# Patient Record
Sex: FEMALE | Race: WHITE | URBAN - METROPOLITAN AREA
[De-identification: names, ages, dates, MRNs, and addresses within clinical notes are randomized per-mention and may not be internally consistent; named-entity substitution may affect disease eponyms.]

---

## 2020-04-14 ENCOUNTER — EMERGENCY (EMERGENCY)
Facility: HOSPITAL | Age: 30
LOS: 1 days | Discharge: HOME | End: 2020-04-14
Admitting: EMERGENCY MEDICINE
Payer: COMMERCIAL

## 2020-04-14 VITALS
RESPIRATION RATE: 20 BRPM | SYSTOLIC BLOOD PRESSURE: 132 MMHG | TEMPERATURE: 97 F | HEART RATE: 85 BPM | DIASTOLIC BLOOD PRESSURE: 70 MMHG | OXYGEN SATURATION: 99 %

## 2020-04-14 PROCEDURE — 93970 EXTREMITY STUDY: CPT | Mod: 26

## 2020-04-14 PROCEDURE — 99284 EMERGENCY DEPT VISIT MOD MDM: CPT

## 2020-04-14 NOTE — ED PROVIDER NOTE - PATIENT PORTAL LINK FT
You can access the FollowMyHealth Patient Portal offered by [facility] by registering at the following website: [email or web address]. By joining [employer]’s FollowMyHealth portal, you will also be able to view your health information using other applications (apps) compatible with our system.

## 2020-04-14 NOTE — ED PROVIDER NOTE - PHYSICAL EXAMINATION
Physical Exam    Vital Signs: I have reviewed the initial vital signs.  Constitutional: well-nourished, appears stated age, no acute distress  Cardiovascular: regular rate, regular rhythm, well-perfused extremities, radial pulses equal and 2+  Respiratory: unlabored respiratory effort, clear to auscultation bilaterally no wheezing, rales and rhonchi. pt speaking full sentences. no accessory muscle use.   Musculoskeletal: no lower extremity edema, no calf tenderness to palpation b/l. negative homman's sign b/l. left knee without tenderness, erythema, or swelling. FROM of left knee. No tenderness erythema, swelling, or mass above the left knee. b/l pedal pulses 2+ and intact. pt feet are warm b/l. no skin changes.   Integumentary: warm, dry, no rash. no cyanosis, no molting of the skin. no stasis dermatitis of lower extremities b/l.   Neurologic: b/l lower extremities’ motor and sensory functions grossly intact Physical Exam    Vital Signs: I have reviewed the initial vital signs.  Constitutional: well-nourished, appears stated age, no acute distress  Cardiovascular: regular rate, regular rhythm, well-perfused extremities, radial pulses equal and 2+  Respiratory: unlabored respiratory effort, clear to auscultation bilaterally no wheezing, rales and rhonchi. pt speaking full sentences. no accessory muscle use.   Musculoskeletal: no lower extremity edema, no calf tenderness to palpation b/l. negative homman's sign b/l. (+) tender muscle bulge and spasm over the distal left quadriceps tendon. left knee without tenderness, erythema, or swelling. FROM of left knee. No swelling, or mass above the left knee. b/l pedal pulses 2+ and intact. pt feet are warm b/l. no skin changes.   Integumentary: warm, dry, no rash. no cyanosis, no molting of the skin. no stasis dermatitis of lower extremities b/l.   Neurologic: b/l lower extremities’ motor and sensory functions grossly intact Physical Exam    Vital Signs: I have reviewed the initial vital signs.  Constitutional: well-nourished, appears stated age, no acute distress  Cardiovascular: regular rate, regular rhythm, well-perfused extremities, radial pulses equal and 2+  Respiratory: unlabored respiratory effort, clear to auscultation bilaterally no wheezing, rales and rhonchi. pt speaking full sentences. no accessory muscle use.   Musculoskeletal: no lower extremity edema, no calf tenderness to palpation b/l. negative tonny's sign b/l. (+) tender muscle bulge and spasm over the distal left quadriceps tendon. left knee without tenderness, erythema, or swelling. FROM of left knee. No swelling, or mass above the left knee. b/l pedal pulses 2+ and intact. pt feet are warm b/l. no skin changes.   Integumentary: warm, dry, no rash. no cyanosis, no molting of the skin. no stasis dermatitis of lower extremities b/l.   Neurologic: b/l lower extremities’ motor and sensory functions grossly intact

## 2020-04-14 NOTE — ED PROVIDER NOTE - PROGRESS NOTE DETAILS
spoke with ty from vascular, negative for dvt I was directly involved in the management of this patient. Case was discussed with PA Fellow Edis

## 2020-04-14 NOTE — ED PROVIDER NOTE - CLINICAL SUMMARY MEDICAL DECISION MAKING FREE TEXT BOX
vascular duplex negative for dvt. advised pt to try antiinflammatories if pain continues. advised of return precaution such as worsenign leg pain, skil changes, inability to ambulate. agreeable to dc. vascular duplex negative for dvt. advised pt to try anti-inflammatories if pain continues. advised of return precaution such as worsening leg pain, skin changes, inability to ambulate. agreeable to dc.  I have discussed the discharge plan with the patient. The patient agrees with the plan, as discussed.  The patient understands Emergency Department diagnosis is a preliminary diagnosis often based on limited information and that the patient must adhere to the follow-up plan as discussed.  The patient understands that if the symptoms worsen or if prescribed medications do not have the desired/planned effect that the patient may return to the Emergency Department at any time for further evaluation and treatment.

## 2020-04-14 NOTE — ED PROVIDER NOTE - NS ED ROS FT
CONST: No fever, chills or bodyaches  EYES: No pain, redness, drainage or visual changes.  ENT: No ear pain or discharge, nasal discharge or congestion. No sore throat  CARD: No chest pain, palpitations  RESP: No SOB, cough, hemoptysis. No hx of asthma or COPD  GI: No abdominal pain, N/V/D  MS: (+) left calf pain, and pain above the left knee. No joint pain, back pain.   SKIN: No rashes  NEURO: No headache, dizziness, paresthesias or LOC

## 2020-04-14 NOTE — ED PROVIDER NOTE - OBJECTIVE STATEMENT
29 y/o female with no significant PMH presents to the ED for evaluation of intermittent left calf throbbing, and discomfort above the left knee x 2-3 weeks. pt admits discomfort began after a long walk. pt admits pain is worse with weight bearing. pt denies alleviating factors. pt admits father had a blood clot years ago without known reason for it. pt denies hx of blood clot, recent travel, recent surgeries, recent hospitalizations, recent trauma, use of birth control, recent pregnancy, cigarette smoking, illicit drug use, chest pain, numbness, tingling, or sob. 29 y/o female with no significant PMH presents to the ED for evaluation of intermittent left calf throbbing, and discomfort above the left knee x 2-3 weeks. pt admits discomfort began after a long walk. Pt reports walking mor frequently than usual in the past few weeks. pt admits pain is worse with weight bearing. pt denies alleviating factors. pt admits father had a blood clot years ago without known reason for it. pt denies hx of blood clot, recent travel, recent surgeries, recent hospitalizations, recent trauma, use of birth control, recent pregnancy, cigarette smoking, illicit drug use, chest pain, numbness, tingling, or sob.

## 2020-04-18 DIAGNOSIS — M79.669 PAIN IN UNSPECIFIED LOWER LEG: ICD-10-CM

## 2020-04-18 DIAGNOSIS — Z88.2 ALLERGY STATUS TO SULFONAMIDES: ICD-10-CM

## 2020-04-18 DIAGNOSIS — M79.662 PAIN IN LEFT LOWER LEG: ICD-10-CM

## 2020-10-28 PROBLEM — Z78.9 OTHER SPECIFIED HEALTH STATUS: Chronic | Status: ACTIVE | Noted: 2020-04-14

## 2020-10-28 PROBLEM — Z00.00 ENCOUNTER FOR PREVENTIVE HEALTH EXAMINATION: Status: ACTIVE | Noted: 2020-10-28

## 2020-10-29 ENCOUNTER — APPOINTMENT (OUTPATIENT)
Dept: OBGYN | Facility: CLINIC | Age: 30
End: 2020-10-29

## 2021-03-08 ENCOUNTER — APPOINTMENT (OUTPATIENT)
Dept: OBGYN | Facility: CLINIC | Age: 31
End: 2021-03-08
Payer: COMMERCIAL

## 2021-03-08 VITALS
TEMPERATURE: 97 F | BODY MASS INDEX: 28.93 KG/M2 | DIASTOLIC BLOOD PRESSURE: 72 MMHG | SYSTOLIC BLOOD PRESSURE: 110 MMHG | HEIGHT: 66 IN | WEIGHT: 180 LBS

## 2021-03-08 DIAGNOSIS — F43.9 REACTION TO SEVERE STRESS, UNSPECIFIED: ICD-10-CM

## 2021-03-08 DIAGNOSIS — Z78.9 OTHER SPECIFIED HEALTH STATUS: ICD-10-CM

## 2021-03-08 DIAGNOSIS — Z80.41 FAMILY HISTORY OF MALIGNANT NEOPLASM OF OVARY: ICD-10-CM

## 2021-03-08 LAB
BILIRUB UR QL STRIP: NORMAL
CLARITY UR: CLEAR
GLUCOSE UR-MCNC: NORMAL
HCG UR QL: NORMAL EU/DL
HGB UR QL STRIP.AUTO: NORMAL
KETONES UR-MCNC: NORMAL
LEUKOCYTE ESTERASE UR QL STRIP: NORMAL
NITRITE UR QL STRIP: NORMAL
PH UR STRIP: 5.5
PROT UR STRIP-MCNC: NORMAL
SP GR UR STRIP: 1.03

## 2021-03-08 PROCEDURE — 99214 OFFICE O/P EST MOD 30 MIN: CPT

## 2021-03-08 PROCEDURE — 99072 ADDL SUPL MATRL&STAF TM PHE: CPT

## 2021-03-08 PROCEDURE — 81003 URINALYSIS AUTO W/O SCOPE: CPT | Mod: QW

## 2021-03-08 RX ORDER — CLINDAMYCIN HYDROCHLORIDE 300 MG/1
300 CAPSULE ORAL
Qty: 20 | Refills: 0 | Status: ACTIVE | COMMUNITY
Start: 2021-03-08 | End: 1900-01-01

## 2021-03-08 RX ORDER — FLUCONAZOLE 150 MG/1
150 TABLET ORAL
Qty: 15 | Refills: 1 | Status: ACTIVE | COMMUNITY
Start: 2021-03-08 | End: 1900-01-01

## 2021-03-08 RX ORDER — CLINDAMYCIN PHOSPHATE 20 MG/G
2 CREAM VAGINAL
Qty: 2 | Refills: 3 | Status: ACTIVE | COMMUNITY
Start: 2021-03-08 | End: 1900-01-01

## 2021-03-30 ENCOUNTER — APPOINTMENT (OUTPATIENT)
Dept: OBGYN | Facility: CLINIC | Age: 31
End: 2021-03-30
Payer: COMMERCIAL

## 2021-03-30 PROCEDURE — 99072 ADDL SUPL MATRL&STAF TM PHE: CPT

## 2021-03-30 PROCEDURE — 76856 US EXAM PELVIC COMPLETE: CPT | Mod: 59

## 2021-03-30 PROCEDURE — 76830 TRANSVAGINAL US NON-OB: CPT

## 2021-04-07 ENCOUNTER — LABORATORY RESULT (OUTPATIENT)
Age: 31
End: 2021-04-07

## 2021-04-08 ENCOUNTER — APPOINTMENT (OUTPATIENT)
Dept: OBGYN | Facility: CLINIC | Age: 31
End: 2021-04-08
Payer: COMMERCIAL

## 2021-04-08 VITALS — BODY MASS INDEX: 28.93 KG/M2 | TEMPERATURE: 97.2 F | HEIGHT: 66 IN | WEIGHT: 180 LBS

## 2021-04-08 DIAGNOSIS — N76.0 ACUTE VAGINITIS: ICD-10-CM

## 2021-04-08 DIAGNOSIS — R10.2 PELVIC AND PERINEAL PAIN: ICD-10-CM

## 2021-04-08 LAB
BILIRUB UR QL STRIP: NORMAL
GLUCOSE UR-MCNC: NORMAL
HCG UR QL: 0.2 EU/DL
HGB UR QL STRIP.AUTO: NORMAL
KETONES UR-MCNC: NORMAL
LEUKOCYTE ESTERASE UR QL STRIP: NORMAL
NITRITE UR QL STRIP: NORMAL
PH UR STRIP: 5.5
PROT UR STRIP-MCNC: NORMAL
SP GR UR STRIP: 1.03

## 2021-04-08 PROCEDURE — 99213 OFFICE O/P EST LOW 20 MIN: CPT

## 2021-04-08 PROCEDURE — 81003 URINALYSIS AUTO W/O SCOPE: CPT | Mod: QW

## 2021-04-08 PROCEDURE — 99072 ADDL SUPL MATRL&STAF TM PHE: CPT

## 2021-04-19 LAB
CYTOLOGY CVX/VAG DOC THIN PREP: NORMAL
HPV HIGH+LOW RISK DNA PNL CVX: DETECTED

## 2021-04-21 ENCOUNTER — NON-APPOINTMENT (OUTPATIENT)
Age: 31
End: 2021-04-21

## 2021-05-24 ENCOUNTER — NON-APPOINTMENT (OUTPATIENT)
Age: 31
End: 2021-05-24

## 2021-06-14 ENCOUNTER — APPOINTMENT (OUTPATIENT)
Dept: OBGYN | Facility: CLINIC | Age: 31
End: 2021-06-14
Payer: COMMERCIAL

## 2021-06-14 VITALS — TEMPERATURE: 97.8 F

## 2021-06-14 DIAGNOSIS — Z86.19 PERSONAL HISTORY OF OTHER INFECTIOUS AND PARASITIC DISEASES: ICD-10-CM

## 2021-06-14 DIAGNOSIS — N89.8 OTHER SPECIFIED NONINFLAMMATORY DISORDERS OF VAGINA: ICD-10-CM

## 2021-06-14 PROCEDURE — 99072 ADDL SUPL MATRL&STAF TM PHE: CPT

## 2021-06-14 PROCEDURE — 99213 OFFICE O/P EST LOW 20 MIN: CPT

## 2021-06-17 ENCOUNTER — NON-APPOINTMENT (OUTPATIENT)
Age: 31
End: 2021-06-17

## 2021-07-19 ENCOUNTER — EMERGENCY (EMERGENCY)
Facility: HOSPITAL | Age: 31
LOS: 0 days | Discharge: HOME | End: 2021-07-20
Attending: EMERGENCY MEDICINE | Admitting: STUDENT IN AN ORGANIZED HEALTH CARE EDUCATION/TRAINING PROGRAM
Payer: COMMERCIAL

## 2021-07-19 VITALS
DIASTOLIC BLOOD PRESSURE: 60 MMHG | HEART RATE: 90 BPM | RESPIRATION RATE: 18 BRPM | SYSTOLIC BLOOD PRESSURE: 120 MMHG | TEMPERATURE: 98 F | WEIGHT: 179.9 LBS | HEIGHT: 64 IN | OXYGEN SATURATION: 100 %

## 2021-07-19 DIAGNOSIS — R60.0 LOCALIZED EDEMA: ICD-10-CM

## 2021-07-19 DIAGNOSIS — R20.0 ANESTHESIA OF SKIN: ICD-10-CM

## 2021-07-19 DIAGNOSIS — R53.1 WEAKNESS: ICD-10-CM

## 2021-07-19 DIAGNOSIS — Z88.2 ALLERGY STATUS TO SULFONAMIDES: ICD-10-CM

## 2021-07-19 DIAGNOSIS — Z91.040 LATEX ALLERGY STATUS: ICD-10-CM

## 2021-07-19 DIAGNOSIS — Z91.013 ALLERGY TO SEAFOOD: ICD-10-CM

## 2021-07-19 LAB
ALBUMIN SERPL ELPH-MCNC: 4.4 G/DL — SIGNIFICANT CHANGE UP (ref 3.5–5.2)
ALP SERPL-CCNC: 90 U/L — SIGNIFICANT CHANGE UP (ref 30–115)
ALT FLD-CCNC: 25 U/L — SIGNIFICANT CHANGE UP (ref 0–41)
ANION GAP SERPL CALC-SCNC: 10 MMOL/L — SIGNIFICANT CHANGE UP (ref 7–14)
AST SERPL-CCNC: 23 U/L — SIGNIFICANT CHANGE UP (ref 0–41)
BASOPHILS # BLD AUTO: 0.05 K/UL — SIGNIFICANT CHANGE UP (ref 0–0.2)
BASOPHILS NFR BLD AUTO: 0.4 % — SIGNIFICANT CHANGE UP (ref 0–1)
BILIRUB SERPL-MCNC: <0.2 MG/DL — SIGNIFICANT CHANGE UP (ref 0.2–1.2)
BUN SERPL-MCNC: 7 MG/DL — LOW (ref 10–20)
CALCIUM SERPL-MCNC: 9.3 MG/DL — SIGNIFICANT CHANGE UP (ref 8.5–10.1)
CHLORIDE SERPL-SCNC: 106 MMOL/L — SIGNIFICANT CHANGE UP (ref 98–110)
CO2 SERPL-SCNC: 26 MMOL/L — SIGNIFICANT CHANGE UP (ref 17–32)
CREAT SERPL-MCNC: 0.8 MG/DL — SIGNIFICANT CHANGE UP (ref 0.7–1.5)
EOSINOPHIL # BLD AUTO: 0.29 K/UL — SIGNIFICANT CHANGE UP (ref 0–0.7)
EOSINOPHIL NFR BLD AUTO: 2.4 % — SIGNIFICANT CHANGE UP (ref 0–8)
GLUCOSE SERPL-MCNC: 94 MG/DL — SIGNIFICANT CHANGE UP (ref 70–99)
HCT VFR BLD CALC: 43.5 % — SIGNIFICANT CHANGE UP (ref 37–47)
HGB BLD-MCNC: 14.1 G/DL — SIGNIFICANT CHANGE UP (ref 12–16)
IMM GRANULOCYTES NFR BLD AUTO: 0.3 % — SIGNIFICANT CHANGE UP (ref 0.1–0.3)
LYMPHOCYTES # BLD AUTO: 27 % — SIGNIFICANT CHANGE UP (ref 20.5–51.1)
LYMPHOCYTES # BLD AUTO: 3.2 K/UL — SIGNIFICANT CHANGE UP (ref 1.2–3.4)
MAGNESIUM SERPL-MCNC: 2 MG/DL — SIGNIFICANT CHANGE UP (ref 1.8–2.4)
MCHC RBC-ENTMCNC: 29 PG — SIGNIFICANT CHANGE UP (ref 27–31)
MCHC RBC-ENTMCNC: 32.4 G/DL — SIGNIFICANT CHANGE UP (ref 32–37)
MCV RBC AUTO: 89.3 FL — SIGNIFICANT CHANGE UP (ref 81–99)
MONOCYTES # BLD AUTO: 0.92 K/UL — HIGH (ref 0.1–0.6)
MONOCYTES NFR BLD AUTO: 7.8 % — SIGNIFICANT CHANGE UP (ref 1.7–9.3)
NEUTROPHILS # BLD AUTO: 7.34 K/UL — HIGH (ref 1.4–6.5)
NEUTROPHILS NFR BLD AUTO: 62.1 % — SIGNIFICANT CHANGE UP (ref 42.2–75.2)
NRBC # BLD: 0 /100 WBCS — SIGNIFICANT CHANGE UP (ref 0–0)
PLATELET # BLD AUTO: 251 K/UL — SIGNIFICANT CHANGE UP (ref 130–400)
POTASSIUM SERPL-MCNC: 4.2 MMOL/L — SIGNIFICANT CHANGE UP (ref 3.5–5)
POTASSIUM SERPL-SCNC: 4.2 MMOL/L — SIGNIFICANT CHANGE UP (ref 3.5–5)
PROT SERPL-MCNC: 7.3 G/DL — SIGNIFICANT CHANGE UP (ref 6–8)
RBC # BLD: 4.87 M/UL — SIGNIFICANT CHANGE UP (ref 4.2–5.4)
RBC # FLD: 12.6 % — SIGNIFICANT CHANGE UP (ref 11.5–14.5)
SODIUM SERPL-SCNC: 142 MMOL/L — SIGNIFICANT CHANGE UP (ref 135–146)
WBC # BLD: 11.84 K/UL — HIGH (ref 4.8–10.8)
WBC # FLD AUTO: 11.84 K/UL — HIGH (ref 4.8–10.8)

## 2021-07-19 PROCEDURE — 99218: CPT

## 2021-07-19 PROCEDURE — 99283 EMERGENCY DEPT VISIT LOW MDM: CPT

## 2021-07-19 PROCEDURE — 93970 EXTREMITY STUDY: CPT | Mod: 26

## 2021-07-19 RX ORDER — ACETAMINOPHEN 500 MG
975 TABLET ORAL ONCE
Refills: 0 | Status: COMPLETED | OUTPATIENT
Start: 2021-07-19 | End: 2021-07-19

## 2021-07-19 RX ADMIN — Medication 975 MILLIGRAM(S): at 22:25

## 2021-07-19 NOTE — ED PROVIDER NOTE - CLINICAL SUMMARY MEDICAL DECISION MAKING FREE TEXT BOX
32 Y/O F WITH BACK PAIN AND LLE PAIN AND WEAKNESS. NO MOTOR WEAKNESS ON EXAM. PT EVALUATED [facility] NEUROLOGY AND MRI RECOMMENDED. PT TO EDOU FOR MRI AND OBSERVATION.

## 2021-07-19 NOTE — CONSULT NOTE ADULT - ATTENDING COMMENTS
I have personally seen and examined this patient.  I have fully participated in the care of this patient.  I have reviewed all pertinent clinical information, including history, physical exam, plan and note.   I have reviewed all pertinent clinical information and reviewed all relevant imaging and diagnostic studies personally.  Pt w/ reported LLE swelling, weakness and numbness with ? findings on exam has intact DTRs r/o thoracic etiology.  Recommendations as above.  Agree with above assessment except as noted.

## 2021-07-19 NOTE — ED PROVIDER NOTE - PHYSICAL EXAMINATION
CONSTITUTIONAL: Well-developed; well-nourished; in no acute distress, nontoxic appearing  SKIN: skin exam is warm and dry  HEAD: Normocephalic; atraumatic  EXT: LLE: edema to dorsum of L foot, pulses 2+ bilaterally. no skin changes/warmth/erythema. No calf tenderness. pelvis stable. steady gait.   NEURO: awake, alert, following commands, oriented, grossly unremarkable. No Focal deficits. GCS 15. no motor/sensory deficit.   PSYCH: Cooperative, appropriate.

## 2021-07-19 NOTE — ED CDU PROVIDER INITIAL DAY NOTE - NS ED ROS FT
Constitutional: (-) fever  Eyes/ENT: (-) blurry vision, (-) epistaxis  Cardiovascular: (-) chest pain, (-) syncope  Respiratory: (-) cough, (-) shortness of breath  Gastrointestinal: (-) vomiting, (-) diarrhea  Musculoskeletal: (-) neck pain, (-) back pain, (+) joint pain  Integumentary: (-) rash, (+) edema  Neurological: (-) headache, (-) altered mental status  Psychiatric: (-) hallucinations  Allergic/Immunologic: (-) pruritus

## 2021-07-19 NOTE — CONSULT NOTE ADULT - ASSESSMENT
Impression:  31 year old woman with no PMH presents to the ED with LLE swelling, pain and numbness for one day. Patient denies trauma. Mild swelling and decreased sensation to distal LLE.     Suggestion:  Patient having LLE duplex now.   Attending to follow.   Impression:  31 year old woman with no PMH presents to the ED with LLE swelling, pain and numbness for one day. Patient denies trauma. Mild swelling and decreased sensation LLE w/ ? weakness.  Has slight hyerreflexia recommend r/o thoracic cord etiology.      Suggestion:  MRI thoracic spine w/w/o bruno  if MR (-), may d/c w/ outpt neurology clinic f/u in 2 - 4 wks and may consider additional testing if no improvement  consider podiatry evaluation for foot swelling  discussed with ED team

## 2021-07-19 NOTE — ED CDU PROVIDER INITIAL DAY NOTE - PHYSICAL EXAMINATION
Vital Signs: I have reviewed the initial vital signs.  Constitutional: well-nourished, appears stated age, no acute distress.  HEENT: Airway patent, moist MM, no erythema/swelling/deformity of oral structures. EOMI, PERRLA.  CV: regular rate, regular rhythm, well-perfused extremities, 2+ b/l DP and radial pulses equal.  Lungs: BCTA, no increased WOB.  ABD: NTND, no guarding or rebound, no pulsatile mass, no hernias, no flank pain.   MSK: Neck supple, nontender, nl ROM, no stepoff. Chest nontender. Back nontender in TLS spine or to b/l bony structures. LLE shows mild swelling in the foot, nontender to palpation, decreased plantarflexion strength on L vs right.   INTEG: Skin warm, dry, no rash.  NEURO: A&Ox3, moving all extremities, normal speech  PSYCH: Calm, cooperative, normal affect and interaction.

## 2021-07-19 NOTE — CONSULT NOTE ADULT - SUBJECTIVE AND OBJECTIVE BOX
Neurology Consult    Patient is a 31y old  Female who presents with a chief complaint of LLE numbness    HPI:  31 year old woman with no PMH presents to the ED with LLE swelling pain and numbness for one day.     PAST MEDICAL & SURGICAL HISTORY:  No pertinent past medical history    No significant past surgical history        FAMILY HISTORY:      Social History: (-) x 3    Allergies    fish (Urticaria)  latex (Rash)  sulfADIAZINE (Anaphylaxis)    Intolerances        MEDICATIONS  (STANDING):    MEDICATIONS  (PRN):    Vital Signs Last 24 Hrs  T(C): 36.7 (19 Jul 2021 04:47), Max: 36.7 (19 Jul 2021 04:47)  T(F): 98 (19 Jul 2021 04:47), Max: 98 (19 Jul 2021 04:47)  HR: 90 (19 Jul 2021 04:47) (90 - 90)  BP: 120/60 (19 Jul 2021 04:47) (120/60 - 120/60)  BP(mean): --  RR: 18 (19 Jul 2021 04:47) (18 - 18)  SpO2: 100% (19 Jul 2021 04:47) (100% - 100%)    Examination:  General:  Appearance is consistent with chronologic age.  No abnormal facies.  Gross skin survey within normal limits.    Cognitive/Language:  The patient is oriented to person, place, time and date.  Recent and remote memory intact.  Fund of knowledge is intact and normal.  Language with normal repetition, comprehension and naming.  Nondysarthric.    Eyes: intact VA, VFF.  EOMI w/o nystagmus, skew or reported double vision.  PERRL.  No ptosis/weakness of eyelid closure.    Face:  Facial sensation normal V1 - 3, no facial asymmetry.    Motor examination:   Normal tone, bulk and range of motion.  No tenderness, twitching, tremors or involuntary movements.  Formal Muscle Strength Testing: (MRC grade R/L) 5/5 UE; 5/5 LE.  No observable drift.  Sensory examination:   Intact to light touch in all extremities except LLE which is decreased to touch, mild swelling to left foot.  Reports decreased sensation to left foot up to mid calf but with parasthesias to LLE up to waist.   Cerebellum:   FTN/HKS intact with normal ZULEIMA in all limbs.  No dysmetria or dysdiadokinesia.  Gait deferred      NIHSS 1    Labs:   CBC Full  -  ( 19 Jul 2021 06:35 )  WBC Count : 11.84 K/uL  RBC Count : 4.87 M/uL  Hemoglobin : 14.1 g/dL  Hematocrit : 43.5 %  Platelet Count - Automated : 251 K/uL  Mean Cell Volume : 89.3 fL  Mean Cell Hemoglobin : 29.0 pg  Mean Cell Hemoglobin Concentration : 32.4 g/dL  Auto Neutrophil # : 7.34 K/uL  Auto Lymphocyte # : 3.20 K/uL  Auto Monocyte # : 0.92 K/uL  Auto Eosinophil # : 0.29 K/uL  Auto Basophil # : 0.05 K/uL  Auto Neutrophil % : 62.1 %  Auto Lymphocyte % : 27.0 %  Auto Monocyte % : 7.8 %  Auto Eosinophil % : 2.4 %  Auto Basophil % : 0.4 %    07-19    142  |  106  |  7<L>  ----------------------------<  94  4.2   |  26  |  0.8    Ca    9.3      19 Jul 2021 06:35  Mg     2.0     07-19    TPro  7.3  /  Alb  4.4  /  TBili  <0.2  /  DBili  x   /  AST  23  /  ALT  25  /  AlkPhos  90  07-19    LIVER FUNCTIONS - ( 19 Jul 2021 06:35 )  Alb: 4.4 g/dL / Pro: 7.3 g/dL / ALK PHOS: 90 U/L / ALT: 25 U/L / AST: 23 U/L / GGT: x                    Neurology Consult    Patient is a 31y old  Female who presents with a chief complaint of LLE numbness    HPI:  31 year old woman with no PMH presents to the ED with LLE swelling pain and numbness for over the last 3 weeks initially starting in the distal left dorsum of foot now affecting the lateral leg and thigh.  Also noted some weakness with weight bearing but still able to ambulate normally.  Denies any LBP or leg pain.  Denies any pain with weight-bearing.  No incontinence or saddle anesthesia.  Slight pain in the L groin with adduction.  No sensory loss in the pelvis or abdomen.  Has had mosquito bites but no tick bites.  No constitutional symptoms including fever, chills, arthralgias, myalgias or GI issues.  Denies any recent trauma.  No problems in the R leg or UE.      Per EHR had similar problems in 4/2020 w/ LLE calf throbbing and reported subjective LE swelling with negative workup.      PAST MEDICAL & SURGICAL HISTORY:  No pertinent past medical history    No significant past surgical history    FAMILY HISTORY:  [place]    Social History: (-) x 3    Allergies    fish (Urticaria)  latex (Rash)  sulfADIAZINE (Anaphylaxis)    Intolerances    MEDICATIONS  (STANDING):    MEDICATIONS  (PRN):    Vital Signs Last 24 Hrs  T(C): 36.7 (19 Jul 2021 04:47), Max: 36.7 (19 Jul 2021 04:47)  T(F): 98 (19 Jul 2021 04:47), Max: 98 (19 Jul 2021 04:47)  HR: 90 (19 Jul 2021 04:47) (90 - 90)  BP: 120/60 (19 Jul 2021 04:47) (120/60 - 120/60)  BP(mean): --  RR: 18 (19 Jul 2021 04:47) (18 - 18)  SpO2: 100% (19 Jul 2021 04:47) (100% - 100%)    Examination:  General:  Appearance is consistent with chronologic age.  No abnormal facies.  Gross skin survey within normal limits.    Cognitive/Language:  The patient is oriented to person, place, time and date.  Recent and remote memory intact.  Fund of knowledge is intact and normal.  Language with normal repetition, comprehension and naming.  Nondysarthric.    Eyes: intact VA, VFF.  EOMI w/o nystagmus, skew or reported double vision.  PERRL.  No ptosis/weakness of eyelid closure.    Face:  Facial sensation normal V1 - 3, no facial asymmetry.    Motor examination:   Normal tone, bulk and range of motion.  No tenderness, twitching, tremors or involuntary movements.  Formal Muscle Strength Testing: (MRC grade R/L) 5/5 UE; 5/4+ LE: LLE ILP 5, QDS 4+, HS 4+, TA 4+, PF 4+, TP 5, PL 4.   No observable drift.  Sensory examination:   Intact to light touch in all extremities except LLE which is decreased to touch, mild swelling to left foot.  No sensory loss groin, thigh or medial leg.  No sensory loss abdomen.  (-) SLR.  (+) slight pain with hip adduction.    Reports decreased sensation to left foot up to mid calf but with parasthesias to LLE up to waist.   DTRs 2+ UE, RLE, 3+ LLE w/o cross adductors.  no clonus.  toes downgoing b/l.    Cerebellum:   FTN/HKS intact with normal ZULEIMA in all limbs.  No dysmetria or dysdiadokinesia.  Gait deferred    Labs:   CBC Full  -  ( 19 Jul 2021 06:35 )  WBC Count : 11.84 K/uL  RBC Count : 4.87 M/uL  Hemoglobin : 14.1 g/dL  Hematocrit : 43.5 %  Platelet Count - Automated : 251 K/uL  Mean Cell Volume : 89.3 fL  Mean Cell Hemoglobin : 29.0 pg  Mean Cell Hemoglobin Concentration : 32.4 g/dL  Auto Neutrophil # : 7.34 K/uL  Auto Lymphocyte # : 3.20 K/uL  Auto Monocyte # : 0.92 K/uL  Auto Eosinophil # : 0.29 K/uL  Auto Basophil # : 0.05 K/uL  Auto Neutrophil % : 62.1 %  Auto Lymphocyte % : 27.0 %  Auto Monocyte % : 7.8 %  Auto Eosinophil % : 2.4 %  Auto Basophil % : 0.4 %    07-19    142  |  106  |  7<L>  ----------------------------<  94  4.2   |  26  |  0.8    Ca    9.3      19 Jul 2021 06:35  Mg     2.0     07-19    TPro  7.3  /  Alb  4.4  /  TBili  <0.2  /  DBili  x   /  AST  23  /  ALT  25  /  AlkPhos  90  07-19    LIVER FUNCTIONS - ( 19 Jul 2021 06:35 )  Alb: 4.4 g/dL / Pro: 7.3 g/dL / ALK PHOS: 90 U/L / ALT: 25 U/L / AST: 23 U/L / GGT: x           < from: VA Duplex Lower Ext Vein Scan, Bilat (07.19.21 @ 08:25) >  Impression:    No evidence of deep venous thrombosis or superficial thrombophlebitis in the bilateral lower extremities.    ICD-10:M79.89    --- End of Report ---    MAICOL FERRIS MD; Attending Vascular Surgeon  This document has been electronically signed. Jul 19 2021 10:58AM    < end of copied text >

## 2021-07-19 NOTE — ED PROVIDER NOTE - OBJECTIVE STATEMENT
31 year old female, no past medical history, who presents with LLE numbness. patient reports intermittent L foot swelling x1 week w/ numbness from foot to thigh that began last night. denies fever, chills, skin changes, hip pain, back pain. no recent falls/trauma. no hx blood clots, hemoptysis, recent travel/surgery/malignancy.

## 2021-07-19 NOTE — ED CDU PROVIDER INITIAL DAY NOTE - MEDICAL DECISION MAKING DETAILS
Patient presented to ED with left foot swelling and numbness. Work up in ED including venous duplex negative for DVT. However, (+) sensory deficit in the left leg. Neuro was consulted by ED team and neuro evaluated patient - recommended obs for MRI lumbar spine. Patient NAD at this time. Will follow up results of MRI, re-eval.

## 2021-07-19 NOTE — ED CDU PROVIDER INITIAL DAY NOTE - ATTENDING CONTRIBUTION TO CARE
I personally evaluated the patient. I reviewed the Resident’s note (as assigned above), and agree with the findings and plan

## 2021-07-19 NOTE — ED PROVIDER NOTE - PROGRESS NOTE DETAILS
patient remained stable in ED, discussed with Neurology NP Ramy, and requested for neurology consult. Patient care transferred to Dr. Jason during the shift change at 7 am. PT SIGNED OUT TO ME BY DR. PRATT, FOLLOW UP LE DUPLEX, NEURO CONSULT, REASSESS AND DISPO. PT SEEN AND EVALUATED BY ME. PT WITH ONE DAY OF L LEG WEAKNESS AND PARESTHESIAS. NO BACK PAIN. NO FEVER, CHILLS. NO TRAUMA. NO HA, VISION/SPEECH CHANGES. NO LUE WEAKNESS OR PARESTHESIAS. VITALS NOTED. + LIMPING GAIT. NO LEG EDEMA. + MILD FOOT EDEMA. 5/5 MOTOR STRENGTH B/L HAMSTRINGS, QUADS, GATROCS AND EHLS. + HYPERREFLEXIC PATELLAR REFLEXES B/L. NO SENSORY DEFICIT B/L LES. Pt evaluated by Dr. Bernabe with plan for OBS MR Lumbar spine w/wo contrast

## 2021-07-19 NOTE — ED ADULT NURSE NOTE - OBJECTIVE STATEMENT
pt is a 32 yo female pw  swollen and painful left foot for a week aw numb. NIH 0, full rom, no redness noted.

## 2021-07-19 NOTE — ED PROVIDER NOTE - NS ED ROS FT
Review of Systems:  	•	CONSTITUTIONAL: no fever, no chills  	•	SKIN: no rash  	•	HEMATOLOGIC: no bleeding, no bruising  	•	RESPIRATORY: no shortness of breath, no cough  	•	CARDIAC: no chest pain, no palpitations  	•	MUSCULOSKELETAL: no joint paint, no swelling, no redness  	•	NEUROLOGIC: +LLE numbness, no weakness  	•	PSYCH: no anxiety, non suicidal, non homicidal, no hallucination, no depression

## 2021-07-20 VITALS
OXYGEN SATURATION: 99 % | RESPIRATION RATE: 18 BRPM | HEART RATE: 85 BPM | DIASTOLIC BLOOD PRESSURE: 72 MMHG | SYSTOLIC BLOOD PRESSURE: 123 MMHG

## 2021-07-20 PROCEDURE — 72157 MRI CHEST SPINE W/O & W/DYE: CPT | Mod: 26,MA

## 2021-07-20 PROCEDURE — 99217: CPT

## 2021-07-20 NOTE — ED CDU PROVIDER DISPOSITION NOTE - NSFOLLOWUPINSTRUCTIONS_ED_ALL_ED_FT
Please follow up with your primary care doctor and [facility] in 1-3 days  Please be aware of any new or worsening signs or symptoms that should prompt your return to the ER.      WHAT YOU NEED TO KNOW:    Paresthesia is numbness, tingling, or burning. It can happen in any part of your body, but usually occurs in your legs, feet, arms, or hands.    DISCHARGE INSTRUCTIONS:    Return to the emergency department if:     You have severe pain along with numbness and tingling.  Your legs suddenly become cold. You have trouble moving your legs, and they ache.  You have increased weakness in a part of your body.  You have uncontrolled movements.    Contact your healthcare provider or neurologist if:     Your symptoms do not improve.  You have symptoms in more than one part of your body.  You have questions or concerns about your condition or care.    Manage paresthesia:     Protect the area from injury. You may injure or burn yourself if you lose feeling in the area. Be careful when you touch anything that could be hot. Wear sturdy shoes to protect your feet. Ask about other ways to protect yourself.   Go to physical or occupational therapy if directed. Your provider may recommend therapy if you have a condition such as carpal tunnel syndrome. A physical therapist can teach you exercises to help strengthen the area or increase your ability to move it. An occupational therapist can help you find new ways to do your daily activities.  Manage health conditions that can cause paresthesia. Work with your diabetes specialist if you have uncontrolled diabetes. A dietitian or your healthcare provider can help you create a meal plan if you have low vitamin B levels. Your provider can help you manage your health if you have multiple sclerosis or you had a stroke. It is important to manage health conditions to stop paresthesia or prevent it from getting worse.  Follow up with your healthcare provider or neurologist as directed: Your healthcare provider may refer you to a specialist. Write down your questions so you remember to ask them during your visits.

## 2021-07-20 NOTE — ED CDU PROVIDER DISPOSITION NOTE - ATTENDING CONTRIBUTION TO CARE
32 yo F no pmh presents with LLE swelling and numbness. Swelling to the foot had been going on for 1 week but started to having numbness and tinglign that started night of arival. labs, duplex done. Seen by neurology who recommends MRI to be done. Patient placed in obs. No events overnight. States that he leg has been improving. numbness sensation has resolved.     CONSTITUTIONAL: Well-developed; well-nourished; in no acute distress.   SKIN: warm, dry  HEAD: Normocephalic; atraumatic.  EYES: PERRL, EOMI, no conjunctival erythema  ENT: No nasal discharge; airway clear.  NECK: Supple; non tender.  CARD: S1, S2 normal;  Regular rate and rhythm.   RESP: No wheezes, rales or rhonchi.  ABD: soft non tender, non distended, no rebound or guarding  EXT: Normal ROM.  5/5 strength in all 4 extremities. + edema to the left foot, no calf tenderness or pedal edema.   LYMPH: No acute cervical adenopathy.  NEURO: A&Ox3, CN 2-11 intact, moving all extremities, 5/5 strength, equal sensation bilaterally  PSYCH: Cooperative, appropriate.

## 2021-07-20 NOTE — ED ADULT NURSE REASSESSMENT NOTE - NS ED NURSE REASSESS COMMENT FT1
Patient report received from previous RN, patient at this time is admitted to OBS for MRI, will wait for transport to come get patient to MRI, currently alert and oriented x4, VSS this AM, will continue to watch and assess patient, safety and comfort measures maintained.
Patient updated on plan of care and understanding was verbalized. Patient denies any change to  initial symptoms since arrival
Pt reassessed report filling headache comfort provide MD and ED PA made aware , Tylenol 650 mg po order is given ,  safety precaution on progress on going nursing observation .
Received pt alert, oriented x 3, resting comfortably, denies any leg pain at this time. Will monitor.
Pt observed comfortably sleeping. Continue to monitor.

## 2021-07-20 NOTE — ED CDU PROVIDER SUBSEQUENT DAY NOTE - ATTENDING CONTRIBUTION TO CARE
30 yo F no pmh presents with LLE swelling and numbness. Swelling to the foot had been going on for 1 week but started to having numbness and tinglign that started night of arival. labs, duplex done. Seen by neurology who recommends MRI to be done. Patient placed in obs. No events overnight. States that he leg has been improving. numbness sensation has resolved.     CONSTITUTIONAL: Well-developed; well-nourished; in no acute distress.   SKIN: warm, dry  HEAD: Normocephalic; atraumatic.  EYES: PERRL, EOMI, no conjunctival erythema  ENT: No nasal discharge; airway clear.  NECK: Supple; non tender.  CARD: S1, S2 normal;  Regular rate and rhythm.   RESP: No wheezes, rales or rhonchi.  ABD: soft non tender, non distended, no rebound or guarding  EXT: Normal ROM.  5/5 strength in all 4 extremities. + edema to the left foot, no calf tenderness or pedal edema.   LYMPH: No acute cervical adenopathy.  NEURO: A&Ox3, CN 2-11 intact, moving all extremities, 5/5 strength, equal sensation bilaterally  PSYCH: Cooperative, appropriate.

## 2021-07-20 NOTE — ED CDU PROVIDER DISPOSITION NOTE - PATIENT PORTAL LINK FT
You can access the FollowMyHealth Patient Portal offered by [facility] by registering at the following website: [email or web address]. By joining [employer]’s FollowMyHealth portal, you will also be able to view your health information using other applications (apps) compatible with our system.

## 2021-07-20 NOTE — ED CDU PROVIDER DISPOSITION NOTE - CARE PROVIDERS DIRECT ADDRESSES
[email or web address]

## 2021-07-20 NOTE — ED CDU PROVIDER DISPOSITION NOTE - CARE PROVIDER_API CALL
Steven Trejo)  Neurology  [address] [place] [address], [address]  [place] [address]  Phone: [phone]  Fax: [phone]  Follow Up Time: 1-3 Days

## 2021-07-20 NOTE — PROGRESS NOTE ADULT - ASSESSMENT
Assessment:  This is a 31y Female w/ h/o     Plan: 31 year old woman with no PMH presents to the ED with LLE swelling for 1 week and numbness/discomfort for one day. Patient denies trauma. Mild swelling +; numbness and weakness resolved.     Suggestion:  - MRI thoracic spine w/w/o bruno done 07/20 - Normal   - outpt neurology clinic f/u in 2 - 4 wks and may consider additional testing if no improvement  - consider podiatry evaluation for foot swelling    ** This is an incomplete note; pending discussion with attending ** 31 year old woman with no PMH presents to the ED with LLE swelling for 1 week and numbness/discomfort for one day. Patient denies trauma. Mild swelling +; numbness and weakness resolved.     Suggestion:  - MRI thoracic spine w/w/o bruno done 07/20 - Normal  - LE duplex (07/19) - No evidence of DVT  - outpt neurology clinic f/u in 2 - 4 wks and may consider additional testing if no improvement  - consider podiatry evaluation for foot swelling    ** This is an incomplete note; pending discussion with attending ** 31 year old woman with no PMH presents to the ED with LLE swelling for 1 week and numbness/discomfort for one day. Patient denies trauma. Mild swelling +; numbness and weakness resolved.     Suggestion:  - MRI thoracic spine w/w/o bruno done 07/20 - Normal  - LE duplex (07/19) - No evidence of DVT  - outpt neurology clinic f/u in 2 - 4 wks  - consider podiatry evaluation for foot swelling   31 year old woman with no PMH presents to the ED with LLE swelling for 1 week and numbness/discomfort for one day. Patient denies trauma. Mild swelling +; numbness and weakness resolved.     Suggestion:  - MRI thoracic spine w/w/o bruno done 07/20 - Normal  - LE duplex (07/19) - No evidence of DVT  - outpt neurology clinic f/u PRN, if symptoms reappear.   - consider podiatry evaluation for foot swelling

## 2021-07-20 NOTE — ED CDU PROVIDER DISPOSITION NOTE - NSFOLLOWUPCLINICS_GEN_ALL_ED_FT
[facility]  Neurology  [address] [place] [address], [facility] [address]  [place] [address]  Phone: [phone]  Fax:   Follow Up Time: 1-3 Days

## 2021-07-20 NOTE — PROGRESS NOTE ADULT - SUBJECTIVE AND OBJECTIVE BOX
Neurology Progress Note    Interval History:        HPI:  31 year old woman with no PMH presents to the ED with LLE swelling pain and numbness for over the last 3 weeks initially starting in the distal left dorsum of foot now affecting the lateral leg and thigh.  Also noted some weakness with weight bearing but still able to ambulate normally.  Denies any LBP or leg pain.  Denies any pain with weight-bearing.  No incontinence or saddle anesthesia.  Slight pain in the L groin with adduction.  No sensory loss in the pelvis or abdomen.  Has had mosquito bites but no tick bites.  No constitutional symptoms including fever, chills, arthralgias, myalgias or GI issues.  Denies any recent trauma.  No problems in the R leg or UE.      Per EHR had similar problems in 4/2020 w/ LLE calf throbbing and reported subjective LE swelling with negative workup.      PAST MEDICAL & SURGICAL HISTORY:  No pertinent past medical history    No significant past surgical history            Medications:      Vital Signs Last 24 Hrs  T(C): 36.6 (20 Jul 2021 00:17), Max: 36.9 (19 Jul 2021 20:44)  T(F): 97.8 (20 Jul 2021 00:17), Max: 98.5 (19 Jul 2021 20:44)  HR: 85 (20 Jul 2021 07:20) (72 - 85)  BP: 123/72 (20 Jul 2021 07:20) (102/58 - 123/72)  BP(mean): --  RR: 18 (20 Jul 2021 07:20) (18 - 19)  SpO2: 99% (20 Jul 2021 07:20) (98% - 100%)    Neurological Exam:   Mental status: Awake, alert and oriented x3.  Recent and remote memory intact.  Naming, repetition and comprehension intact.  Attention/concentration intact.  No dysarthria, no aphasia.  Fund of knowledge appropriate.    Cranial nerves: Pupils equally round and reactive to light, visual fields full, no nystagmus, extraocular muscles intact, V1 through V3 intact bilaterally and symmetric, face symmetric, hearing intact to finger rub, palate elevation symmetric, tongue was midline.  Motor:  MRC grading 5/5 b/l UE/LE.   strength 5/5.  Normal tone and bulk.  No abnormal movements.    Sensation: Intact to light touch, proprioception, and pinprick.   Coordination: No dysmetria on finger-to-nose and heel-to-shin.  No dysdiadokinesia.  Reflexes: 2+ in bilateral UE/LE, downgoing toes bilaterally. (-) Vergara.  Gait: Narrow and steady. No ataxia.  Romberg negative    Labs:  CBC Full  -  ( 19 Jul 2021 06:35 )  WBC Count : 11.84 K/uL  RBC Count : 4.87 M/uL  Hemoglobin : 14.1 g/dL  Hematocrit : 43.5 %  Platelet Count - Automated : 251 K/uL  Mean Cell Volume : 89.3 fL  Mean Cell Hemoglobin : 29.0 pg  Mean Cell Hemoglobin Concentration : 32.4 g/dL  Auto Neutrophil # : 7.34 K/uL  Auto Lymphocyte # : 3.20 K/uL  Auto Monocyte # : 0.92 K/uL  Auto Eosinophil # : 0.29 K/uL  Auto Basophil # : 0.05 K/uL  Auto Neutrophil % : 62.1 %  Auto Lymphocyte % : 27.0 %  Auto Monocyte % : 7.8 %  Auto Eosinophil % : 2.4 %  Auto Basophil % : 0.4 %    07-19    142  |  106  |  7<L>  ----------------------------<  94  4.2   |  26  |  0.8    Ca    9.3      19 Jul 2021 06:35  Mg     2.0     07-19    TPro  7.3  /  Alb  4.4  /  TBili  <0.2  /  DBili  x   /  AST  23  /  ALT  25  /  AlkPhos  90  07-19    LIVER FUNCTIONS - ( 19 Jul 2021 06:35 )  Alb: 4.4 g/dL / Pro: 7.3 g/dL / ALK PHOS: 90 U/L / ALT: 25 U/L / AST: 23 U/L / GGT: x                Neurology Progress Note    Interval History:    Patient was seen at bedside this morning. Patient reports the numbness on the left LE is almost gone and the swelling has improved significantly. She denies any pain, weakness, stiffness. She is able to walk without difficulty.     HPI:  31 year old woman with no PMH presents to the ED with LLE swelling pain and numbness for over the last 3 weeks initially starting in the distal left dorsum of foot now affecting the lateral leg and thigh.  Also noted some weakness with weight bearing but still able to ambulate normally.  Denies any LBP or leg pain.  Denies any pain with weight-bearing.  No incontinence or saddle anesthesia.  Slight pain in the L groin with adduction.  No sensory loss in the pelvis or abdomen.  Has had mosquito bites but no tick bites.  No constitutional symptoms including fever, chills, arthralgias, myalgias or GI issues.  Denies any recent trauma.  No problems in the R leg or UE.      Per EHR had similar problems in 4/2020 w/ LLE calf throbbing and reported subjective LE swelling with negative workup.      PAST MEDICAL & SURGICAL HISTORY:  No pertinent past medical history    No significant past surgical history            Medications:      Vital Signs Last 24 Hrs  T(C): 36.6 (20 Jul 2021 00:17), Max: 36.9 (19 Jul 2021 20:44)  T(F): 97.8 (20 Jul 2021 00:17), Max: 98.5 (19 Jul 2021 20:44)  HR: 85 (20 Jul 2021 07:20) (72 - 85)  BP: 123/72 (20 Jul 2021 07:20) (102/58 - 123/72)  BP(mean): --  RR: 18 (20 Jul 2021 07:20) (18 - 19)  SpO2: 99% (20 Jul 2021 07:20) (98% - 100%)    Neurological Exam:   Mental status: Awake, alert and oriented x3.  Recent and remote memory intact.  Naming, repetition and comprehension intact.  Attention/concentration intact.  No dysarthria, no aphasia.  Fund of knowledge appropriate.    Cranial nerves: Pupils equally round and reactive to light, visual fields full, no nystagmus, extraocular muscles intact, V1 through V3 intact bilaterally and symmetric, face symmetric, hearing intact to finger rub, palate elevation symmetric, tongue was midline.  Motor:  MRC grading 5/5 b/l UE/LE.   strength 5/5.  Normal tone and bulk.  No abnormal movements.    Sensation: Intact to light touch, proprioception on the lower extremities.   Coordination: No dysmetria on finger-to-nose and heel-to-shin.  No dysdiadokinesia.  Gait: Narrow and steady. No ataxia.  Romberg negative    Labs:  CBC Full  -  ( 19 Jul 2021 06:35 )  WBC Count : 11.84 K/uL  RBC Count : 4.87 M/uL  Hemoglobin : 14.1 g/dL  Hematocrit : 43.5 %  Platelet Count - Automated : 251 K/uL  Mean Cell Volume : 89.3 fL  Mean Cell Hemoglobin : 29.0 pg  Mean Cell Hemoglobin Concentration : 32.4 g/dL  Auto Neutrophil # : 7.34 K/uL  Auto Lymphocyte # : 3.20 K/uL  Auto Monocyte # : 0.92 K/uL  Auto Eosinophil # : 0.29 K/uL  Auto Basophil # : 0.05 K/uL  Auto Neutrophil % : 62.1 %  Auto Lymphocyte % : 27.0 %  Auto Monocyte % : 7.8 %  Auto Eosinophil % : 2.4 %  Auto Basophil % : 0.4 %    07-19    142  |  106  |  7<L>  ----------------------------<  94  4.2   |  26  |  0.8    Ca    9.3      19 Jul 2021 06:35  Mg     2.0     07-19    TPro  7.3  /  Alb  4.4  /  TBili  <0.2  /  DBili  x   /  AST  23  /  ALT  25  /  AlkPhos  90  07-19    LIVER FUNCTIONS - ( 19 Jul 2021 06:35 )  Alb: 4.4 g/dL / Pro: 7.3 g/dL / ALK PHOS: 90 U/L / ALT: 25 U/L / AST: 23 U/L / GGT: x           RADIOLOGY:   EXAM:  MR SPINE THORACIC [facility]        PROCEDURE DATE:  07/20/2021            INTERPRETATION:  CLINICAL INDICATION: Weakness, swelling, numbness.    TECHNIQUE: Multi-planar multi-sequential MR imaging of the thoracic spine was performed before and after the intravenous administration of contrast, according to standard protocol. 8.5 ml of Gadavist was administered.  1.5 mL was discarded.    COMPARISON: None available    FINDINGS:    ALIGNMENT:  The alignment is normal.    VERTEBRAE: The vertebral bodies are normal in height. No acute fracture or aggressive osseous lesion.    DISCS: The disc spaces are maintained.    CORD: The conus medullaris terminates at T12-L1. There is no intrinsic spinal cord signal abnormality.    ENHANCEMENT: No evidence of abnormal enhancement.    EVALUATION OF INDIVIDUAL LEVELS: No disc herniation, spinal canal or neuroforaminal stenosis.    PARAVERTEBRAL SOFT TISSUES: The visualized paravertebral soft tissues appear within normal limits.      IMPRESSION:    Unremarkable contrast enhanced MRI of the thoracic spine.

## 2021-07-20 NOTE — ED CDU PROVIDER SUBSEQUENT DAY NOTE - MEDICAL DECISION MAKING DETAILS
Patient presents with LLE swelling and numbness. negative duplex. neuro recommending MRI. Placed in obs for further evaluation.

## 2021-07-20 NOTE — ED CDU PROVIDER DISPOSITION NOTE - CLINICAL COURSE
Patient presents with swelling and numbness to left foot. labs, duplex done. Seen by neuro who recommends MRI. Patient placed in obs for MRI which was done and read normally. Discussed with neuro team who clears patient for discharge with outpatient management.

## 2021-08-11 ENCOUNTER — EMERGENCY (EMERGENCY)
Facility: HOSPITAL | Age: 31
LOS: 0 days | Discharge: HOME | End: 2021-08-12
Attending: EMERGENCY MEDICINE | Admitting: EMERGENCY MEDICINE
Payer: COMMERCIAL

## 2021-08-11 VITALS
DIASTOLIC BLOOD PRESSURE: 90 MMHG | RESPIRATION RATE: 18 BRPM | HEART RATE: 118 BPM | SYSTOLIC BLOOD PRESSURE: 134 MMHG | OXYGEN SATURATION: 99 % | TEMPERATURE: 98 F | HEIGHT: 64 IN

## 2021-08-11 DIAGNOSIS — Z91.013 ALLERGY TO SEAFOOD: ICD-10-CM

## 2021-08-11 DIAGNOSIS — Z88.2 ALLERGY STATUS TO SULFONAMIDES: ICD-10-CM

## 2021-08-11 DIAGNOSIS — R10.2 PELVIC AND PERINEAL PAIN: ICD-10-CM

## 2021-08-11 DIAGNOSIS — Z87.42 PERSONAL HISTORY OF OTHER DISEASES OF THE FEMALE GENITAL TRACT: ICD-10-CM

## 2021-08-11 DIAGNOSIS — N71.0 ACUTE INFLAMMATORY DISEASE OF UTERUS: ICD-10-CM

## 2021-08-11 DIAGNOSIS — Z91.040 LATEX ALLERGY STATUS: ICD-10-CM

## 2021-08-11 PROCEDURE — 99285 EMERGENCY DEPT VISIT HI MDM: CPT

## 2021-08-12 VITALS
HEART RATE: 84 BPM | RESPIRATION RATE: 18 BRPM | OXYGEN SATURATION: 99 % | DIASTOLIC BLOOD PRESSURE: 64 MMHG | SYSTOLIC BLOOD PRESSURE: 98 MMHG

## 2021-08-12 LAB
ANION GAP SERPL CALC-SCNC: 12 MMOL/L — SIGNIFICANT CHANGE UP (ref 7–14)
APPEARANCE UR: CLEAR — SIGNIFICANT CHANGE UP
BASOPHILS # BLD AUTO: 0.04 K/UL — SIGNIFICANT CHANGE UP (ref 0–0.2)
BASOPHILS NFR BLD AUTO: 0.3 % — SIGNIFICANT CHANGE UP (ref 0–1)
BILIRUB UR-MCNC: NEGATIVE — SIGNIFICANT CHANGE UP
BUN SERPL-MCNC: 12 MG/DL — SIGNIFICANT CHANGE UP (ref 10–20)
C TRACH RRNA SPEC QL NAA+PROBE: SIGNIFICANT CHANGE UP
CALCIUM SERPL-MCNC: 9.6 MG/DL — SIGNIFICANT CHANGE UP (ref 8.5–10.1)
CHLORIDE SERPL-SCNC: 102 MMOL/L — SIGNIFICANT CHANGE UP (ref 98–110)
CO2 SERPL-SCNC: 24 MMOL/L — SIGNIFICANT CHANGE UP (ref 17–32)
COLOR SPEC: SIGNIFICANT CHANGE UP
CREAT SERPL-MCNC: 1.1 MG/DL — SIGNIFICANT CHANGE UP (ref 0.7–1.5)
DIFF PNL FLD: NEGATIVE — SIGNIFICANT CHANGE UP
EOSINOPHIL # BLD AUTO: 0.12 K/UL — SIGNIFICANT CHANGE UP (ref 0–0.7)
EOSINOPHIL NFR BLD AUTO: 0.8 % — SIGNIFICANT CHANGE UP (ref 0–8)
GLUCOSE SERPL-MCNC: 105 MG/DL — HIGH (ref 70–99)
GLUCOSE UR QL: NEGATIVE — SIGNIFICANT CHANGE UP
HCT VFR BLD CALC: 41.9 % — SIGNIFICANT CHANGE UP (ref 37–47)
HGB BLD-MCNC: 13.9 G/DL — SIGNIFICANT CHANGE UP (ref 12–16)
IMM GRANULOCYTES NFR BLD AUTO: 0.4 % — HIGH (ref 0.1–0.3)
KETONES UR-MCNC: ABNORMAL
LEUKOCYTE ESTERASE UR-ACNC: NEGATIVE — SIGNIFICANT CHANGE UP
LYMPHOCYTES # BLD AUTO: 15.9 % — LOW (ref 20.5–51.1)
LYMPHOCYTES # BLD AUTO: 2.47 K/UL — SIGNIFICANT CHANGE UP (ref 1.2–3.4)
MCHC RBC-ENTMCNC: 29 PG — SIGNIFICANT CHANGE UP (ref 27–31)
MCHC RBC-ENTMCNC: 33.2 G/DL — SIGNIFICANT CHANGE UP (ref 32–37)
MCV RBC AUTO: 87.5 FL — SIGNIFICANT CHANGE UP (ref 81–99)
MONOCYTES # BLD AUTO: 0.71 K/UL — HIGH (ref 0.1–0.6)
MONOCYTES NFR BLD AUTO: 4.6 % — SIGNIFICANT CHANGE UP (ref 1.7–9.3)
N GONORRHOEA RRNA SPEC QL NAA+PROBE: SIGNIFICANT CHANGE UP
NEUTROPHILS # BLD AUTO: 12.13 K/UL — HIGH (ref 1.4–6.5)
NEUTROPHILS NFR BLD AUTO: 78 % — HIGH (ref 42.2–75.2)
NITRITE UR-MCNC: NEGATIVE — SIGNIFICANT CHANGE UP
NRBC # BLD: 0 /100 WBCS — SIGNIFICANT CHANGE UP (ref 0–0)
PH UR: 6 — SIGNIFICANT CHANGE UP (ref 5–8)
PLATELET # BLD AUTO: 270 K/UL — SIGNIFICANT CHANGE UP (ref 130–400)
POTASSIUM SERPL-MCNC: 4.9 MMOL/L — SIGNIFICANT CHANGE UP (ref 3.5–5)
POTASSIUM SERPL-SCNC: 4.9 MMOL/L — SIGNIFICANT CHANGE UP (ref 3.5–5)
PROT UR-MCNC: SIGNIFICANT CHANGE UP
RBC # BLD: 4.79 M/UL — SIGNIFICANT CHANGE UP (ref 4.2–5.4)
RBC # FLD: 12.3 % — SIGNIFICANT CHANGE UP (ref 11.5–14.5)
SODIUM SERPL-SCNC: 138 MMOL/L — SIGNIFICANT CHANGE UP (ref 135–146)
SP GR SPEC: 1.02 — SIGNIFICANT CHANGE UP (ref 1.01–1.03)
SPECIMEN SOURCE: SIGNIFICANT CHANGE UP
UROBILINOGEN FLD QL: SIGNIFICANT CHANGE UP
WBC # BLD: 15.53 K/UL — HIGH (ref 4.8–10.8)
WBC # FLD AUTO: 15.53 K/UL — HIGH (ref 4.8–10.8)

## 2021-08-12 PROCEDURE — 76830 TRANSVAGINAL US NON-OB: CPT | Mod: 26

## 2021-08-12 PROCEDURE — 74176 CT ABD & PELVIS W/O CONTRAST: CPT | Mod: 26,MA

## 2021-08-12 RX ORDER — ACETAMINOPHEN 500 MG
650 TABLET ORAL ONCE
Refills: 0 | Status: COMPLETED | OUTPATIENT
Start: 2021-08-12 | End: 2021-08-12

## 2021-08-12 RX ORDER — IBUPROFEN 200 MG
600 TABLET ORAL ONCE
Refills: 0 | Status: COMPLETED | OUTPATIENT
Start: 2021-08-12 | End: 2021-08-12

## 2021-08-12 RX ADMIN — Medication 600 MILLIGRAM(S): at 01:30

## 2021-08-12 RX ADMIN — Medication 600 MILLIGRAM(S): at 00:59

## 2021-08-12 RX ADMIN — Medication 650 MILLIGRAM(S): at 06:06

## 2021-08-12 NOTE — ED PROVIDER NOTE - OBJECTIVE STATEMENT
pt with pmhx pcos presents to ED c/o persistent pelvic pain since colposcopy 10 days ago, performed by  2/2 abnormal pap. has seen multiple GYNs- , . saw  following procedure and had neg exam, told sxs were likely nl post procedure and to f/u with the Dr who did it. went back to  and was told sxs expected and to take motrin. pt has been underdosing motrin without relief. pain is sharp, nonradiating, moderate. denies exacerbating or relieving factors. Denies fever/chill/HA/dizziness/chest pain/palpitation/sob/n/v/d/ black stool/bloody stool/urinary sxs

## 2021-08-12 NOTE — CONSULT NOTE ADULT - ASSESSMENT
30yo  with LMP  s/p colposcopy with endometrial and endocervical biopsy r/o endometritis, currently clinically and hemodynamically stable  -ultrasound unremarkable  -f/u CT scan  -recommend doxycycline 100BID for 10 days  -toradol for pain can send home with motrin 600 q6hrs  -final recs pending CT scan    Dr. Flores and Dr. Broussard aware 30yo  with LMP  s/p colposcopy with endometrial and endocervical biopsy r/o endometritis, currently clinically and hemodynamically stable.    - no acute GYN intervention  - ultrasound and CT unremarkable  - recommend doxycycline 100mg BID for 10 days  - toradol for pain in ED can send home with motrin 600 q6hrs PRN  - f/up in office with Dr. Flores  - anurag per ED    Dr. Flores and Dr. Broussard aware

## 2021-08-12 NOTE — ED PROVIDER NOTE - PATIENT PORTAL LINK FT
You can access the FollowMyHealth Patient Portal offered by [facility] by registering at the following website: [email or web address]. By joining [employer]’s FollowMyHealth portal, you will also be able to view your health information using other applications (apps) compatible with our system.

## 2021-08-12 NOTE — CONSULT NOTE ADULT - SUBJECTIVE AND OBJECTIVE BOX
Chief Complaint: lower abdominal pain and back pain    HPI: 32yo      Location -  Severity -  Quality -  Duration -  Timing -   Modifying Factors -   Associated Signs/Symptoms -     Ob/Gyn History:  G P                 LMP -                   Cycle Length -   Denies history of ovarian cysts, uterine fibroids, abnormal paps, or STIs  Last Pap Smear -   Last Mammogram -   Last Colonoscopy -        Denies the following: constitutional symptoms, visual symptoms, cardiovascular symptoms, respiratory symptoms, GI symptoms, musculoskeletal symptoms, skin symptoms, neurologic symptoms, hematologic symptoms, allergic symptoms, psychiatric symptoms  Except any pertinent positives listed.     Home Medications:      Allergies    fish (Urticaria)  latex (Rash)  sulfADIAZINE (Anaphylaxis)    Intolerances        PAST MEDICAL & SURGICAL HISTORY:  No pertinent past medical history    No significant past surgical history        FAMILY HISTORY:      SOCIAL HISTORY: Denies cigarette use, alcohol use, or illicit drug use    Vital Signs Last 24 Hrs  T(F): 98.1 (11 Aug 2021 22:23), Max: 98.1 (11 Aug 2021 22:23)  HR: 84 (12 Aug 2021 02:10) (84 - 118)  BP: 98/64 (12 Aug 2021 02:10) (98/64 - 134/90)  RR: 18 (12 Aug 2021 02:10) (18 - 18)    General Appearance - AAOx3, NAD  Heart - S1S2 regular rate and rhythm  Lung - CTA Bilaterally  Abdomen - Soft, nontender, nondistended, no rebound, no rigidity, no guarding, bowel sounds present    GYN/Pelvis:    Labia Majora - Normal  Labia Minora - Normal  Clitoris - Normal  Urethra - Normal  Vagina - Normal  Cervix - Normal, no CVA tenderness    Uterus:  Size - Normal, 6 week size   Tenderness - minimal fundal tenderness  Mass - None  Freely mobile    Adnexa:  Masses - None  Tenderness - None      LABS:                        13.9   15.53 )-----------( 270      ( 12 Aug 2021 00:50 )             41.9         08-12    138  |  102  |  12  ----------------------------<  105<H>  4.9   |  24  |  1.1    Ca    9.6      12 Aug 2021 00:50        Urinalysis Basic - ( 12 Aug 2021 01:30 )    Color: Light Yellow / Appearance: Clear / S.020 / pH: x  Gluc: x / Ketone: Moderate  / Bili: Negative / Urobili: <2 mg/dL   Blood: x / Protein: Trace / Nitrite: Negative   Leuk Esterase: Negative / RBC: x / WBC x   Sq Epi: x / Non Sq Epi: x / Bacteria: x          RADIOLOGY & ADDITIONAL STUDIES:   Chief Complaint: lower abdominal pain and back pain    HPI: 30yo      Location -  Severity -  Quality -  Duration -  Timing -   Modifying Factors -   Associated Signs/Symptoms -     Ob/Gyn History:  G P                 LMP -                   Cycle Length -   Denies history of ovarian cysts, uterine fibroids, abnormal paps, or STIs  Last Pap Smear -       Denies the following: constitutional symptoms, visual symptoms, cardiovascular symptoms, respiratory symptoms, GI symptoms, musculoskeletal symptoms, skin symptoms, neurologic symptoms, hematologic symptoms, allergic symptoms, psychiatric symptoms  Except any pertinent positives listed.     Home Medications:      Allergies    fish (Urticaria)  latex (Rash)  sulfADIAZINE (Anaphylaxis)    Intolerances        PAST MEDICAL & SURGICAL HISTORY:  No pertinent past medical history    No significant past surgical history        FAMILY HISTORY:      SOCIAL HISTORY: Denies cigarette use, alcohol use, or illicit drug use    Vital Signs Last 24 Hrs  T(F): 98.1 (11 Aug 2021 22:23), Max: 98.1 (11 Aug 2021 22:23)  HR: 84 (12 Aug 2021 02:10) (84 - 118)  BP: 98/64 (12 Aug 2021 02:10) (98/64 - 134/90)  RR: 18 (12 Aug 2021 02:10) (18 - 18)    General Appearance - AAOx3, NAD  Heart - S1S2 regular rate and rhythm  Lung - CTA Bilaterally  Abdomen - Soft, nontender, nondistended, no rebound, no rigidity, no guarding, bowel sounds present    GYN/Pelvis:    Labia Majora - Normal  Labia Minora - Normal  Clitoris - Normal  Urethra - Normal  Vagina - Normal  Cervix - Normal, no CVA tenderness    Uterus:  Size - Normal, 6 week size   Tenderness - minimal fundal tenderness  Mass - None  Freely mobile    Adnexa:  Masses - None  Tenderness - None      LABS:                        13.9   15.53 )-----------( 270      ( 12 Aug 2021 00:50 )             41.9         08-12    138  |  102  |  12  ----------------------------<  105<H>  4.9   |  24  |  1.1    Ca    9.6      12 Aug 2021 00:50        Urinalysis Basic - ( 12 Aug 2021 01:30 )    Color: Light Yellow / Appearance: Clear / S.020 / pH: x  Gluc: x / Ketone: Moderate  / Bili: Negative / Urobili: <2 mg/dL   Blood: x / Protein: Trace / Nitrite: Negative   Leuk Esterase: Negative / RBC: x / WBC x   Sq Epi: x / Non Sq Epi: x / Bacteria: x          RADIOLOGY & ADDITIONAL STUDIES:  us< from: US Transvaginal (21 @ 04:58) >  FINDINGS:    Uterus: 7.0 cm x 3.4 cm x 4.2 cm. Within normal limits.  Endometrium: 9 mm. Within normal limits.    Right ovary: Not visualized.  Left ovary: not visualized.    Fluid: None.    IMPRESSION:    Bilateral ovaries are not visualized.    Unremarkable uterus and endometrium.    Additional Findings/Recommendations After Attending Radiologist Review:  There are small foci of echogenicity within the cervix consistent with small calcifications.    < end of copied text >      < from: CT Abdomen and Pelvis No Cont (21 @ 05:12) >  FINDINGS:    LOWER CHEST: Unremarkable.    HEPATOBILIARY: Unremarkable.    SPLEEN: Unremarkable.    PANCREAS: Unremarkable.    ADRENAL GLANDS: Unremarkable.    KIDNEYS: No nephroureteral calculi or hydronephrosis.    ABDOMINOPELVIC NODES: No lymphadenopathy.    PELVIC ORGANS: Unremarkable.    PERITONEUM/MESENTERY/BOWEL: No bowel obstruction, ascites or pneumoperitoneum. Moderate diffuse colonic stool burden. Normal appendix.    BONES/SOFT TISSUES: No acute osseous abnormality.    IMPRESSION:  No CT evidence of an acute abdominopelvic pathology Chief Complaint: lower abdominal pain and back pain    HPI: 32yo  LMP  no PMH, s/p colposcopy with emb and ECC on  presenting to the ED with abdominal pain worse in the left lower abdomen, radiating to the back, burning in quality, 9/10 in intensity with no alleviating or aggrevating factors. Patient states that shortly after her procedure she had some cramping pain that resolved within 1-2 days. This pain started subsequently and has not gone away. Patient denies fevers, chills, foul smelling discharge, dysuria, headache, chest pain, SOB, LE pain or swelling    Ob/Gyn History:                   LMP -                   Cycle Length - regular  Denies history of ovarian cysts, uterine fibroids, abnormal paps, or STIs  Last Pap Smear - 2020      Denies the following: constitutional symptoms, visual symptoms, cardiovascular symptoms, respiratory symptoms, GI symptoms, musculoskeletal symptoms, skin symptoms, neurologic symptoms, hematologic symptoms, allergic symptoms, psychiatric symptoms  Except any pertinent positives listed.     Home Medications:      Allergies    fish (Urticaria)  latex (Rash)  sulfADIAZINE (Anaphylaxis)      PAST MEDICAL & SURGICAL HISTORY:  No pertinent past medical history  dilation and curettage    FAMILY HISTORY:      SOCIAL HISTORY: Denies cigarette use, alcohol use, or illicit drug use    Vital Signs Last 24 Hrs  T(F): 98.1 (11 Aug 2021 22:23), Max: 98.1 (11 Aug 2021 22:23)  HR: 84 (12 Aug 2021 02:10) (84 - 118)  BP: 98/64 (12 Aug 2021 02:10) (98/64 - 134/90)  RR: 18 (12 Aug 2021 02:10) (18 - 18)    General Appearance - AAOx3, NAD  Heart - S1S2 regular rate and rhythm  Lung - CTA Bilaterally  Abdomen - Soft, nontender, nondistended, no rebound, no rigidity, no guarding, bowel sounds present    GYN/Pelvis:    Labia Majora - Normal  Labia Minora - Normal  Clitoris - Normal  Urethra - Normal  Vagina - Normal  Cervix - Normal, no CVA tenderness    Uterus:  Size - Normal, 6 week size   Tenderness - minimal fundal tenderness  Mass - None  Freely mobile    Adnexa:  Masses - None  Tenderness - None      LABS:                        13.9   15.53 )-----------( 270      ( 12 Aug 2021 00:50 )             41.9             138  |  102  |  12  ----------------------------<  105<H>  4.9   |  24  |  1.1    Ca    9.6      12 Aug 2021 00:50        Urinalysis Basic - ( 12 Aug 2021 01:30 )    Color: Light Yellow / Appearance: Clear / S.020 / pH: x  Gluc: x / Ketone: Moderate  / Bili: Negative / Urobili: <2 mg/dL   Blood: x / Protein: Trace / Nitrite: Negative   Leuk Esterase: Negative / RBC: x / WBC x   Sq Epi: x / Non Sq Epi: x / Bacteria: x          RADIOLOGY & ADDITIONAL STUDIES:  us< from: US Transvaginal (21 @ 04:58) >  FINDINGS:    Uterus: 7.0 cm x 3.4 cm x 4.2 cm. Within normal limits.  Endometrium: 9 mm. Within normal limits.    Right ovary: Not visualized.  Left ovary: not visualized.    Fluid: None.    IMPRESSION:    Bilateral ovaries are not visualized.    Unremarkable uterus and endometrium.    Additional Findings/Recommendations After Attending Radiologist Review:  There are small foci of echogenicity within the cervix consistent with small calcifications.    < end of copied text >      < from: CT Abdomen and Pelvis No Cont (21 @ 05:12) >  FINDINGS:    LOWER CHEST: Unremarkable.    HEPATOBILIARY: Unremarkable.    SPLEEN: Unremarkable.    PANCREAS: Unremarkable.    ADRENAL GLANDS: Unremarkable.    KIDNEYS: No nephroureteral calculi or hydronephrosis.    ABDOMINOPELVIC NODES: No lymphadenopathy.    PELVIC ORGANS: Unremarkable.    PERITONEUM/MESENTERY/BOWEL: No bowel obstruction, ascites or pneumoperitoneum. Moderate diffuse colonic stool burden. Normal appendix.    BONES/SOFT TISSUES: No acute osseous abnormality.    IMPRESSION:  No CT evidence of an acute abdominopelvic pathology

## 2021-08-12 NOTE — ED PROVIDER NOTE - NSFOLLOWUPCLINICS_GEN_ALL_ED_FT
[facility]  OB/GYN  [address] [place] [address]  Phone: [phone]  Fax:

## 2021-08-12 NOTE — ED ADULT NURSE NOTE - OBJECTIVE STATEMENT
pt presents to ED c/o 10 days vaginal bleeding, pt endorses she "has been bleeding since endocervical biopsy" and now has left abdominal and flank pain. Pt denies n/v/d. Pt denies fever/chills.

## 2021-08-12 NOTE — ED PROVIDER NOTE - PHYSICAL EXAMINATION
CONSTITUTIONAL: Well-appearing; well-nourished; in no apparent distress.   CARDIOVASCULAR: Normal S1, S2; no murmurs, rubs, or gallops.   RESPIRATORY: Normal chest excursion with respiration; breath sounds clear and equal bilaterally; no wheezes, rhonchi, or rales.  GI/: diffuse SP ttp, requested GYN perform pelvic. non-distended; no palpable organomegaly.   SKIN: Normal for age and race; warm; dry; good turgor; no apparent lesions or exudate.   NEURO/PSYCH: A & O x 4; grossly unremarkable. mood and manner are appropriate.

## 2021-08-12 NOTE — ED PROVIDER NOTE - ATTENDING CONTRIBUTION TO CARE
pt co spurapubic pain rad to her left flank for 10 days after a cervical and vaginal biopsy. no fever, chills, n, v, d.  mild vaginal bleeding that stopped 3 days ago.  pt in nad, ab soft, nt, nd.   refusing pelvic exam.    labs, imaging, supportive care.

## 2021-08-13 LAB
CULTURE RESULTS: SIGNIFICANT CHANGE UP
SPECIMEN SOURCE: SIGNIFICANT CHANGE UP

## 2021-09-02 ENCOUNTER — EMERGENCY (EMERGENCY)
Facility: HOSPITAL | Age: 31
LOS: 0 days | Discharge: HOME | End: 2021-09-03
Attending: EMERGENCY MEDICINE | Admitting: EMERGENCY MEDICINE
Payer: COMMERCIAL

## 2021-09-02 VITALS
RESPIRATION RATE: 16 BRPM | HEART RATE: 84 BPM | SYSTOLIC BLOOD PRESSURE: 154 MMHG | TEMPERATURE: 99 F | OXYGEN SATURATION: 100 % | HEIGHT: 64 IN | DIASTOLIC BLOOD PRESSURE: 81 MMHG | WEIGHT: 179.9 LBS

## 2021-09-02 DIAGNOSIS — Z20.822 CONTACT WITH AND (SUSPECTED) EXPOSURE TO COVID-19: ICD-10-CM

## 2021-09-02 DIAGNOSIS — R20.2 PARESTHESIA OF SKIN: ICD-10-CM

## 2021-09-02 DIAGNOSIS — I87.8 OTHER SPECIFIED DISORDERS OF VEINS: ICD-10-CM

## 2021-09-02 DIAGNOSIS — R11.0 NAUSEA: ICD-10-CM

## 2021-09-02 DIAGNOSIS — Z91.040 LATEX ALLERGY STATUS: ICD-10-CM

## 2021-09-02 DIAGNOSIS — R51.9 HEADACHE, UNSPECIFIED: ICD-10-CM

## 2021-09-02 DIAGNOSIS — Z88.2 ALLERGY STATUS TO SULFONAMIDES: ICD-10-CM

## 2021-09-02 DIAGNOSIS — Z91.013 ALLERGY TO SEAFOOD: ICD-10-CM

## 2021-09-02 DIAGNOSIS — R20.0 ANESTHESIA OF SKIN: ICD-10-CM

## 2021-09-02 LAB
ALBUMIN SERPL ELPH-MCNC: 4.5 G/DL — SIGNIFICANT CHANGE UP (ref 3.5–5.2)
ALP SERPL-CCNC: 80 U/L — SIGNIFICANT CHANGE UP (ref 30–115)
ALT FLD-CCNC: 24 U/L — SIGNIFICANT CHANGE UP (ref 0–41)
ANION GAP SERPL CALC-SCNC: 10 MMOL/L — SIGNIFICANT CHANGE UP (ref 7–14)
APTT BLD: 38.9 SEC — SIGNIFICANT CHANGE UP (ref 27–39.2)
AST SERPL-CCNC: 21 U/L — SIGNIFICANT CHANGE UP (ref 0–41)
BASOPHILS # BLD AUTO: 0.03 K/UL — SIGNIFICANT CHANGE UP (ref 0–0.2)
BASOPHILS NFR BLD AUTO: 0.4 % — SIGNIFICANT CHANGE UP (ref 0–1)
BILIRUB SERPL-MCNC: 0.3 MG/DL — SIGNIFICANT CHANGE UP (ref 0.2–1.2)
BLD GP AB SCN SERPL QL: SIGNIFICANT CHANGE UP
BUN SERPL-MCNC: 10 MG/DL — SIGNIFICANT CHANGE UP (ref 10–20)
CALCIUM SERPL-MCNC: 9.4 MG/DL — SIGNIFICANT CHANGE UP (ref 8.5–10.1)
CHLORIDE SERPL-SCNC: 103 MMOL/L — SIGNIFICANT CHANGE UP (ref 98–110)
CO2 SERPL-SCNC: 26 MMOL/L — SIGNIFICANT CHANGE UP (ref 17–32)
CREAT SERPL-MCNC: 1 MG/DL — SIGNIFICANT CHANGE UP (ref 0.7–1.5)
EOSINOPHIL # BLD AUTO: 0.29 K/UL — SIGNIFICANT CHANGE UP (ref 0–0.7)
EOSINOPHIL NFR BLD AUTO: 3.7 % — SIGNIFICANT CHANGE UP (ref 0–8)
ETHANOL SERPL-MCNC: <10 MG/DL — SIGNIFICANT CHANGE UP
GLUCOSE SERPL-MCNC: 119 MG/DL — HIGH (ref 70–99)
HCG SERPL QL: NEGATIVE — SIGNIFICANT CHANGE UP
HCT VFR BLD CALC: 44.1 % — SIGNIFICANT CHANGE UP (ref 37–47)
HGB BLD-MCNC: 14.7 G/DL — SIGNIFICANT CHANGE UP (ref 12–16)
IMM GRANULOCYTES NFR BLD AUTO: 0.3 % — SIGNIFICANT CHANGE UP (ref 0.1–0.3)
INR BLD: 0.96 RATIO — SIGNIFICANT CHANGE UP (ref 0.65–1.3)
LYMPHOCYTES # BLD AUTO: 2.25 K/UL — SIGNIFICANT CHANGE UP (ref 1.2–3.4)
LYMPHOCYTES # BLD AUTO: 28.8 % — SIGNIFICANT CHANGE UP (ref 20.5–51.1)
MCHC RBC-ENTMCNC: 28.8 PG — SIGNIFICANT CHANGE UP (ref 27–31)
MCHC RBC-ENTMCNC: 33.3 G/DL — SIGNIFICANT CHANGE UP (ref 32–37)
MCV RBC AUTO: 86.3 FL — SIGNIFICANT CHANGE UP (ref 81–99)
MONOCYTES # BLD AUTO: 0.71 K/UL — HIGH (ref 0.1–0.6)
MONOCYTES NFR BLD AUTO: 9.1 % — SIGNIFICANT CHANGE UP (ref 1.7–9.3)
NEUTROPHILS # BLD AUTO: 4.5 K/UL — SIGNIFICANT CHANGE UP (ref 1.4–6.5)
NEUTROPHILS NFR BLD AUTO: 57.7 % — SIGNIFICANT CHANGE UP (ref 42.2–75.2)
NRBC # BLD: 0 /100 WBCS — SIGNIFICANT CHANGE UP (ref 0–0)
PLATELET # BLD AUTO: 256 K/UL — SIGNIFICANT CHANGE UP (ref 130–400)
POTASSIUM SERPL-MCNC: 4.5 MMOL/L — SIGNIFICANT CHANGE UP (ref 3.5–5)
POTASSIUM SERPL-SCNC: 4.5 MMOL/L — SIGNIFICANT CHANGE UP (ref 3.5–5)
PROT SERPL-MCNC: 7.4 G/DL — SIGNIFICANT CHANGE UP (ref 6–8)
PROTHROM AB SERPL-ACNC: 11 SEC — SIGNIFICANT CHANGE UP (ref 9.95–12.87)
RBC # BLD: 5.11 M/UL — SIGNIFICANT CHANGE UP (ref 4.2–5.4)
RBC # FLD: 12.2 % — SIGNIFICANT CHANGE UP (ref 11.5–14.5)
SARS-COV-2 RNA SPEC QL NAA+PROBE: SIGNIFICANT CHANGE UP
SODIUM SERPL-SCNC: 139 MMOL/L — SIGNIFICANT CHANGE UP (ref 135–146)
TROPONIN T SERPL-MCNC: <0.01 NG/ML — SIGNIFICANT CHANGE UP
WBC # BLD: 7.8 K/UL — SIGNIFICANT CHANGE UP (ref 4.8–10.8)
WBC # FLD AUTO: 7.8 K/UL — SIGNIFICANT CHANGE UP (ref 4.8–10.8)

## 2021-09-02 PROCEDURE — 71045 X-RAY EXAM CHEST 1 VIEW: CPT | Mod: 26

## 2021-09-02 PROCEDURE — 0042T: CPT

## 2021-09-02 PROCEDURE — 93010 ELECTROCARDIOGRAM REPORT: CPT

## 2021-09-02 PROCEDURE — 99218: CPT

## 2021-09-02 PROCEDURE — 99283 EMERGENCY DEPT VISIT LOW MDM: CPT

## 2021-09-02 PROCEDURE — 70498 CT ANGIOGRAPHY NECK: CPT | Mod: 26,MA

## 2021-09-02 PROCEDURE — 70496 CT ANGIOGRAPHY HEAD: CPT | Mod: 26,MA

## 2021-09-02 RX ORDER — ACETAMINOPHEN 500 MG
975 TABLET ORAL ONCE
Refills: 0 | Status: COMPLETED | OUTPATIENT
Start: 2021-09-02 | End: 2021-09-02

## 2021-09-02 RX ORDER — ONDANSETRON 8 MG/1
4 TABLET, FILM COATED ORAL ONCE
Refills: 0 | Status: COMPLETED | OUTPATIENT
Start: 2021-09-02 | End: 2021-09-02

## 2021-09-02 RX ORDER — DIPHENHYDRAMINE HCL 50 MG
25 CAPSULE ORAL ONCE
Refills: 0 | Status: COMPLETED | OUTPATIENT
Start: 2021-09-02 | End: 2021-09-02

## 2021-09-02 RX ADMIN — Medication 975 MILLIGRAM(S): at 19:32

## 2021-09-02 RX ADMIN — Medication 25 MILLIGRAM(S): at 20:30

## 2021-09-02 RX ADMIN — ONDANSETRON 4 MILLIGRAM(S): 8 TABLET, FILM COATED ORAL at 17:00

## 2021-09-02 NOTE — CONSULT NOTE ADULT - ASSESSMENT
32 y/o F was brought to ED for left sided numbness. S/p code stroke, TPA not given as NIH is low (0), non-dibilitating symptom. She has a new onset headache, associated with nausea. Could be complex migraine.  - Patient can be observed in ED-obs  - MRI of brain w/wo contrast.  - May administer Diphenhydramine+ Phenergon+ Tylenol for headache and nausea.  - Please call Neurology if any new /worsening symptoms.  - Neurology will follow 30 y/o F was brought to ED for left sided numbness. S/p code stroke, TPA not given as NIH is low (0), non-debilitating symptom. She has a new onset headache, associated with nausea. Could be complex migraine.  - Patient can be observed in ED-obs  - MRI of brain w/wo contrast.  - May administer Diphenhydramine+ Phenergon+ Tylenol for headache and nausea.  - Please call Neurology if any new /worsening symptoms.  - Neurology will follow

## 2021-09-02 NOTE — ED CDU PROVIDER INITIAL DAY NOTE - NS ED ROS FT
Review of Systems:  CONSTITUTIONAL - No fever  SKIN - No rash  HEMATOLOGIC - No abnormal bleeding or bruising  RESPIRATORY - No shortness of breath, No cough  CARDIAC -No chest pain, No palpitations  GI - No abdominal pain, No vomiting, No diarrhea  - No dysuria, frequency, hematuria  MUSCULOSKELETAL - No joint paint, No swelling, No back pain  NEUROLOGIC - No focal weakness, No dizziness  All other systems negative, unless specified in HPI

## 2021-09-02 NOTE — CONSULT NOTE ADULT - TIME BILLING
Review of imaging and chart; obtaining history; examination of pt; discussion and coordination of care.

## 2021-09-02 NOTE — ED PROVIDER NOTE - ATTENDING CONTRIBUTION TO CARE
I evaluated the patient immediately upon arrival to the ED. 31F with venous stasis, follows with vascular surgery who p/w pain and paresthesias with numbness to LUE since 12pm today. She reports that paresthesias to LLE has been present x1mo however LUE is new. Denies focal weakness, ataxia, aphasia. gluc 145 on arrival. Non-smoker. No FHx early CVA.     CONSTITUTIONAL: Well-developed; well-nourished; in no acute distress. Sitting up and providing appropriate history and physical examination  SKIN: skin exam is warm and dry, no acute rash.  HEAD: Normocephalic; atraumatic.  EYES: PERRL, 3 mm bilateral, no nystagmus, EOM intact; conjunctiva and sclera clear.  ENT: No nasal discharge; airway clear.  NECK: Supple; non tender. + full passive ROM in all directions. No JVD  CARD: S1, S2 normal; no murmurs, gallops, or rubs. Regular rate and rhythm. + Symmetric Strong Pulses  RESP: No wheezes, rales or rhonchi. Good air movement bilaterally  ABD: soft; non-distended; non-tender. No Rebound, No Guarding, No signs of peritonitis, No CVA tenderness. No pulsatile abdominal mass. + Strong and Symmetric Pulses  EXT: Normal ROM. No clubbing, cyanosis or edema. Dp and Pt Pulses intact. Cap refill less than 3 seconds  NEURO: CN II-XII grossly intact; subjective numbness to LUE, normal finger to nose, normal romberg, stable gait, no sensory or motor deficits, Alert, oriented, grossly unremarkable. No Focal deficits. GCS 15. NIH 1  PSYCH: Cooperative, appropriate.    a/p: code stroke called- HCT, CT C-spine, CTA head/neck, labs, ekg, cxr, freq neuro checks

## 2021-09-02 NOTE — ED PROVIDER NOTE - CLINICAL SUMMARY MEDICAL DECISION MAKING FREE TEXT BOX
31F with venous stasis, follows with vascular surgery who p/w pain and paresthesias with numbness to LUE since 12pm today. NIH 1 on arrival, HCT, CTA, CTP negative for large CVA or LVO, large perfusion deficit, respectively. Neuro recommended obs for MRI wo contrast. Pt updated at the bedside.

## 2021-09-02 NOTE — CONSULT NOTE ADULT - SUBJECTIVE AND OBJECTIVE BOX
30 y/o F with PMH of Vit-B12 def, Vit-D def, Pelvic infection, anxiety presented to ED with headache, nausea, tingling and numbness of LUE and pain and numbness of LLE. Stroke code was called, NIH:1 for sensory.CTH: no acute pathology, CTA: No LVO, CTP: no deficit. She has been experiencing intermittent numb feeling for past 2-3 months. But the headache started yesterday which is not getting better. It is associated with nausea. This noon, her symptoms got worse and she started feel "pins and needles" in her left hand. No H/O stroke or any other neurological disease.    PAST MEDICAL & SURGICAL HISTORY:  No pertinent past medical history    No significant past surgical history    ROS:  See HPI.    Allergies    fish (Urticaria)  latex (Rash)  sulfADIAZINE (Anaphylaxis)      Vital Signs Last 24 Hrs  T(C): 37.2 (02 Sep 2021 14:28), Max: 37.2 (02 Sep 2021 14:28)  T(F): 98.9 (02 Sep 2021 14:28), Max: 98.9 (02 Sep 2021 14:28)  HR: 84 (02 Sep 2021 14:28) (84 - 84)  BP: 154/81 (02 Sep 2021 14:28) (154/81 - 154/81)  BP(mean): --  RR: 16 (02 Sep 2021 14:28) (16 - 16)  SpO2: 100% (02 Sep 2021 14:28) (100% - 100%)    PHYSICAL EXAM:  Neurologic:  Mental status: Awake, alert and oriented x3.  Recent and remote memory intact.  Naming, repetition and comprehension intact.  Attention/concentration intact.  No dysarthria, no aphasia.  Fund of knowledge appropriate.    Cranial nerves: pupils equally round and reactive to light, visual fields full, no nystagmus, extraocular muscles intact, V1 through V3 intact bilaterally and symmetric, face symmetric, hearing intact to finger rub, palate elevation symmetric, tongue was midline   Motor:  Normal bulk and tone, strength 5/5 in bilateral upper and lower extremities.   strength 5/5.  Rapid alternating movements intact and symmetric.   Sensation: mildly decreased sensation on LUE,  No neglect  Coordination: No dysmetria on finger-to-nose  Reflexes: 2+ in upper and lower extremities, downgoing toes bilaterally  Gait: Narrow and steady. No ataxia.      NIHSS: 1 for sensory    Fingerstick Blood Glucose: CAPILLARY BLOOD GLUCOSE      POCT Blood Glucose.: 145 mg/dL (02 Sep 2021 14:39)       LABS:                        14.7   7.80  )-----------( 256      ( 02 Sep 2021 15:00 )             44.1     09-02    139  |  103  |  10  ----------------------------<  119<H>  4.5   |  26  |  1.0    Ca    9.4      02 Sep 2021 15:00    TPro  7.4  /  Alb  4.5  /  TBili  0.3  /  DBili  x   /  AST  21  /  ALT  24  /  AlkPhos  80  09-02    PT/INR - ( 02 Sep 2021 15:00 )   PT: 11.00 sec;   INR: 0.96 ratio         PTT - ( 02 Sep 2021 15:00 )  PTT:38.9 sec  CARDIAC MARKERS ( 02 Sep 2021 15:00 )  x     / <0.01 ng/mL / x     / x     / x              RADIOLOGY & ADDITIONAL STUDIES:        < from: CT Brain Stroke Protocol (09.02.21 @ 14:47) >  No CT evidence of large acute territorial infarct.    No evidence of acute intracranial pathology. No mass effect, midline shift or intracranial hemorrhage.    If symptoms persist, MRI could be obtained for further evaluation if not clinically contraindicated.    < end of copied text >  < from: CT Angio Neck w/ IV Cont (09.02.21 @ 15:00) >  CT PERFUSION:  No evidence of perfusion abnormality.    CTA HEAD:  No evidence of flow-limiting stenosis, occlusion or aneurysm.    CTA NECK:  No evidence of carotid or vertebral artery stenosis.    < end of copied text >

## 2021-09-02 NOTE — ED ADULT NURSE NOTE - OBJECTIVE STATEMENT
pt with c/o left arm numbness and groin pain . starting at 12 noon. pt c/o dizziness today. code stroke activated in triage.

## 2021-09-02 NOTE — ED CDU PROVIDER INITIAL DAY NOTE - PROGRESS NOTE DETAILS
PP: Patient complaining of headache. Ordered benadryl, tylenol, promethazine as per neuro recs. received signout from Dr. Almonte P; pt in obs for evaluation of headache, Left side tingling, photophobia; initial workup ct head, cta h/n labs neg; covid pending; pt pending mr head in am; Attending Note: 32 y/o F presented to ED with HA, nausea, and tingling in the left upper extremity. Stroke code called upon arrival due to sensory deficits in the arm which is subjective. Here CT brain, perfusion, angio WNL, XR nl, EKG nl, labs grossly unremarkable. Pt pending MRI of the brain with and without contrast.

## 2021-09-02 NOTE — CONSULT NOTE ADULT - ATTENDING COMMENTS
Pt is a 30 yo F with PMhx of vitamin-D deficiency, recent pelvic infection, anxiety, who presents with headache and left upper extremity paresthesia. NIHSS 1 (reports decreased sensation in left arm, no  weakness, fine finger movements intact, able to ambulate well). mRS 0. CT head without acute process. Pt is not an IV thrombolytic candidate due to mild, nondisabling symptoms, CTA head/neck without significant flow limiting stenosis or evidence of dissection. Agree with admission to Obs, MRI brain w/ and w/o. Headache treatment.

## 2021-09-02 NOTE — ED PROVIDER NOTE - PHYSICAL EXAMINATION
CONSTITUTIONAL: well-appearing, in NAD  SKIN: Warm dry, normal skin turgor  HEAD: NCAT  EYES: EOMI, PERRLA, no scleral icterus, conjunctiva pink  ENT: normal pharynx with no erythema or exudates  NECK: Supple; non tender. Full ROM.  CARD: RRR, no murmurs.  RESP: clear to ausculation b/l. No crackles or wheezing.  ABD: soft, non-tender, non-distended, no rebound or guarding.  EXT: Full ROM, no bony tenderness, no pedal edema, no calf tenderness  NEURO: normal motor. normal sensory. CN II-XII intact. Cerebellar testing normal. Normal gait. NIHSS 0  PSYCH: Cooperative, appropriate.

## 2021-09-02 NOTE — ED PROVIDER NOTE - OBJECTIVE STATEMENT
32 yo F with PMH of venous stasis presenting with pain and tingling to LUE since 12 pm today. Endorses tingling to LLE for 1 month. Denies HT, LOC, AC, numbness, weakness, tingling, headache, vision changes, dizziness, CP, SOB, NVD, dysuria, hematuria, melena, hematochezia, fever, cold/flu symptoms.

## 2021-09-02 NOTE — ED CDU PROVIDER INITIAL DAY NOTE - PHYSICAL EXAMINATION
CONSTITUTIONAL: well developed, well nourished, in no acute distress, speaking in full sentences, nontoxic appearing  SKIN: warm, dry, no rash  HEAD: normocephalic, atraumatic  EYES: PERRL, EOMI, no conjunctival erythema, no nystagmus  ENT: patent airway, moist mucous membranes, no tongue deviation  NECK: supple, no masses, full flexion/extension without pain  CV:  regular rate, regular rhythm, 2+ radial pulses bilaterally  RESP: no wheezes, no rales, no rhonchi, normal work of breathing  ABD: soft, nontender, nondistended, no rebound, no guarding  MSK: normal ROM, no cyanosis, no edema  NEURO: alert, oriented, CN 2-12 grossly intact, sensation intact to light touch symmetrically, 5/5 motor strength in all extremities, normal finger to nose, normal rapid repetitive alternating movements, no pronator drift, no facial asymmetry, normal gait  PSYCH: cooperative, appropriate

## 2021-09-02 NOTE — ED PROVIDER NOTE - NS ED ROS FT
Constitutional:  (-) fever, (-) chills, (-) lethargy  Eyes:  (-) eye pain (-) visual changes  ENMT: (-) nasal discharge, (-) sore throat. (-) neck pain or stiffness  Cardiac: (-) chest pain (-) palpitations  Respiratory:  (-) cough (-) respiratory distress.   GI:  (-) nausea (-) vomiting (-) diarrhea (-) abdominal pain.  :  (-) dysuria (-) frequency (-) burning.  MS:  (-) back pain (-) joint pain.  Neuro:  (-) headache (-) numbness (+) tingling, LUE and LLE (-) focal weakness  Skin:  (-) rash  Except as documented in the HPI,  all other systems are negative

## 2021-09-02 NOTE — ED CDU PROVIDER INITIAL DAY NOTE - OBJECTIVE STATEMENT
31Y F w/ no PMH presents with CC of LUE numbness, Headache and nausea since 12PM today. Patient reports associated photophobia, nausea. Denies fever, chills, chest pain, SOB, abdominal pain, diarrhea.

## 2021-09-02 NOTE — ED ADULT TRIAGE NOTE - CHIEF COMPLAINT QUOTE
Abd pain, left arm pain and numbness (started today around noon), left leg pain, left groin pain, left side head pain and pressure (ongoing). Stroke Code activated in triage.

## 2021-09-03 VITALS
HEART RATE: 72 BPM | OXYGEN SATURATION: 97 % | DIASTOLIC BLOOD PRESSURE: 57 MMHG | TEMPERATURE: 97 F | RESPIRATION RATE: 16 BRPM | SYSTOLIC BLOOD PRESSURE: 115 MMHG

## 2021-09-03 PROCEDURE — 99217: CPT

## 2021-09-03 PROCEDURE — 70553 MRI BRAIN STEM W/O & W/DYE: CPT | Mod: 26,MA

## 2021-09-03 PROCEDURE — 99283 EMERGENCY DEPT VISIT LOW MDM: CPT

## 2021-09-03 RX ORDER — DIPHENHYDRAMINE HCL 50 MG
25 CAPSULE ORAL ONCE
Refills: 0 | Status: COMPLETED | OUTPATIENT
Start: 2021-09-03 | End: 2021-09-03

## 2021-09-03 RX ORDER — SODIUM CHLORIDE 9 MG/ML
1000 INJECTION INTRAMUSCULAR; INTRAVENOUS; SUBCUTANEOUS ONCE
Refills: 0 | Status: COMPLETED | OUTPATIENT
Start: 2021-09-03 | End: 2021-09-03

## 2021-09-03 RX ORDER — METOCLOPRAMIDE HCL 10 MG
10 TABLET ORAL ONCE
Refills: 0 | Status: DISCONTINUED | OUTPATIENT
Start: 2021-09-03 | End: 2021-09-03

## 2021-09-03 RX ORDER — METOCLOPRAMIDE HCL 10 MG
10 TABLET ORAL ONCE
Refills: 0 | Status: COMPLETED | OUTPATIENT
Start: 2021-09-03 | End: 2021-09-03

## 2021-09-03 RX ORDER — SODIUM CHLORIDE 9 MG/ML
1000 INJECTION, SOLUTION INTRAVENOUS ONCE
Refills: 0 | Status: DISCONTINUED | OUTPATIENT
Start: 2021-09-03 | End: 2021-09-03

## 2021-09-03 RX ADMIN — Medication 10 MILLIGRAM(S): at 10:45

## 2021-09-03 RX ADMIN — Medication 25 MILLIGRAM(S): at 10:46

## 2021-09-03 RX ADMIN — SODIUM CHLORIDE 1000 MILLILITER(S): 9 INJECTION INTRAMUSCULAR; INTRAVENOUS; SUBCUTANEOUS at 10:46

## 2021-09-03 NOTE — ED CDU PROVIDER SUBSEQUENT DAY NOTE - HISTORY
pt resting in obs without complaints - headache improving; mri brain pending; will continue to follow

## 2021-09-03 NOTE — PROGRESS NOTE ADULT - ATTENDING COMMENTS
Pt doing well overall, headache is still present as well as left arm pain and paresthesia.   MRI brain pending. If negative, OK to d/c home from neuro perspective with follow up in general neurology clinic for headache.

## 2021-09-03 NOTE — PROGRESS NOTE ADULT - SUBJECTIVE AND OBJECTIVE BOX
[place] Progress Note:    VINCE DOOLEY    1. Chief Complaint: headache, left sided numbness    HPI: 32 y/o F with PMH of Vit-B12 def, Vit-D def, Pelvic infection, anxiety presented to ED with headache, nausea, tingling and numbness of LUE and pain and numbness of LLE. Stroke code was called, NIH:1 for sensory.CTH: no acute pathology, CTA: No LVO, CTP: no deficit. She has been experiencing intermittent numb feeling for past 2-3 months. But the headache started yesterday which is not getting better. It is associated with nausea. This noon, her symptoms got worse and she started feel "pins and needles" in her left hand. No H/O stroke or any other neurological disease.        2. Relevant PMH:   Prior ischemic stroke/TIA[ ], Afib [ ], CAD [ ], HTN [ ], DLD [ ], DM [ ], PVD [ ], Obesity [ ],   Sedentary lifestyle [ ], CHF [ ], POLINA [ ], Cancer Hx [ ].    3. Social History: Smoking [ ], Drug Use [ ], Alcohol Use [ ], Other [ ]    4. Possible Location of Stroke:    5. Relevant Brain Tissue Imagin. Relevant Cerebrovascular Imaging:   CT Angio Neck w/ IV Cont:   EXAM:  CT ANGIO BRAIN (W)AW IC        EXAM:  CT ANGIO NECK (W)AW IC        EXAM:  CT PERFUSION W MAPS IC            PROCEDURE DATE:  2021            INTERPRETATION:  CLINICAL INDICATION: Stroke. Left lower extremity weakness..    TECHNIQUE: CT angiography of the intracranial (head) and extracranial (neck) circulation was performed after contrast administration    Maximal intensity projection images were additionally included and reviewed.    RAPID PERFUSION images were obtained. Color maps were reviewed.    120 mls of Omnipaque 350 was administered intravenously without complication and 80 mls were discarded.    Using a separate workstation, 3-D shaded surface reformations were made of vasculature. 3-D reformations were reviewed and included in interpretation of the official report.    CAROTID STENOSIS REFERENCE: (NASCET = 100x1-(N/D)). N=greatest narrowing. D=normal distal diameter - MILD = <50% stenosis. - MODERATE = 50-69% stenosis. - SEVERE = 70-89% stenosis. - HAIRLINE/CRITICAL = 90-99% stenosis. - OCCLUDED = 100% stenosis.    COMPARISON: None.    FINDINGS:    CT PERFUSION:    There is no evidence of perfusion mismatch.    HEAD CTA:    The internal carotid arteries demonstrate normal enhancement to the intracranial circulation and Andre[place]ski of [place].    Anterior and middle cerebral arteries demonstrate normal enhancement and symmetric arborization without intraluminal filling defect, stenosis, occlusion, aneurysm or vascular malformation.    The intradural vertebral arteries demonstrate normal enhancement to the vertebrobasilar confluence. Branch vasculature of the posterior circulation are within normal limits. The posterior cerebral arteries demonstrate symmetric enhancement and arborization without evidence for aneurysm, stenosis, occlusion or vascular malformation. The left PCA is fetal in origin and unremarkable.    Visualized dural venous sinuses and deep cerebral venous structures demonstrate normal enhancement without evidence for filling defect.    NECKCTA:    The aortic arch and origins of the great vessels are within normal limits.    Right:  The origin of the right common carotid artery is normal. The right common carotid artery is normal in course and caliber to the carotid bifurcation. Originsof the internal and external carotid arteries are normal by NASCET criteria. The right internal carotid artery has normal course and caliber to the intracranial circulation.    The origin of the right vertebral artery is normal. The right vertebral artery is normal in course and caliber to the intracranial circulation.    Left: The origin of the left common carotid artery is normal. The left common carotid artery is normal in course and caliber to the carotid bifurcation. Origins of the internal and external carotid arteries are normal by NASCET criteria. The left internal carotid artery has normal course and caliber to the intracranial circulation.    The origin of the left vertebral artery is normal. The left vertebral artery is normal in course and caliber to the intracranial circulation.    IMPRESSION:    CT PERFUSION:  No evidence of perfusion abnormality.    CTA HEAD:  No evidence of flow-limiting stenosis, occlusion or aneurysm.    CTA NECK:  No evidence of carotid or vertebral artery stenosis.    --- End of Report ---              ROSEMARY JUARES MD; Attending Radiologist  This document has been electronically signed. Sep  2 2021  3:29PM (21 @ 15:00)     CT Angio Head w/ IV Cont:   EXAM:  CT ANGIO BRAIN (W)AW IC        EXAM:  CT ANGIO NECK (W)AW IC        EXAM:  CT PERFUSION W MAPS IC            PROCEDURE DATE:  2021            INTERPRETATION:  CLINICAL INDICATION: Stroke. Left lower extremity weakness..    TECHNIQUE: CT angiography of the intracranial (head) and extracranial (neck) circulation was performed after contrast administration    Maximal intensity projection images were additionally included and reviewed.    RAPID PERFUSION images were obtained. Color maps were reviewed.    120 mls of Omnipaque 350 was administered intravenously without complication and 80 mls were discarded.    Using a separate workstation, 3-D shaded surface reformations were made of vasculature. 3-D reformations were reviewed and included in interpretation of the official report.    CAROTID STENOSIS REFERENCE: (NASCET = 100x1-(N/D)). N=greatest narrowing. D=normal distal diameter - MILD = <50% stenosis. - MODERATE = 50-69% stenosis. - SEVERE = 70-89% stenosis. - HAIRLINE/CRITICAL = 90-99% stenosis. - OCCLUDED = 100% stenosis.    COMPARISON: None.    FINDINGS:    CT PERFUSION:    There is no evidence of perfusion mismatch.    HEAD CTA:    The internal carotid arteries demonstrate normal enhancement to the intracranial circulation and Andreafski of [place].    Anterior and middle cerebral arteries demonstrate normal enhancement and symmetric arborization without intraluminal filling defect, stenosis, occlusion, aneurysm or vascular malformation.    The intradural vertebral arteries demonstrate normal enhancement to the vertebrobasilar confluence. Branch vasculature of the posterior circulation are within normal limits. The posterior cerebral arteries demonstrate symmetric enhancement and arborization without evidence for aneurysm, stenosis, occlusion or vascular malformation. The left PCA is fetal in origin and unremarkable.    Visualized dural venous sinuses and deep cerebral venous structures demonstrate normal enhancement without evidence for filling defect.    NECKCTA:    The aortic arch and origins of the great vessels are within normal limits.    Right:  The origin of the right common carotid artery is normal. The right common carotid artery is normal in course and caliber to the carotid bifurcation. Originsof the internal and external carotid arteries are normal by NASCET criteria. The right internal carotid artery has normal course and caliber to the intracranial circulation.    The origin of the right vertebral artery is normal. The right vertebral artery is normal in course and caliber to the intracranial circulation.    Left: The origin of the left common carotid artery is normal. The left common carotid artery is normal in course and caliber to the carotid bifurcation. Origins of the internal and external carotid arteries are normal by NASCET criteria. The left internal carotid artery has normal course and caliber to the intracranial circulation.    The origin of the left vertebral artery is normal. The left vertebral artery is normal in course and caliber to the intracranial circulation.    IMPRESSION:    CT PERFUSION:  No evidence of perfusion abnormality.    CTA HEAD:  No evidence of flow-limiting stenosis, occlusion or aneurysm.    CTA NECK:  No evidence of carotid or vertebral artery stenosis.    --- End of Report ---              ROSEMARY JUARES MD; Attending Radiologist  This document has been electronically signed. Sep  2 2021  3:29PM (21 @ 15:00)     CT Perfusion w/ Maps w/ IV Cont:   EXAM:  CT ANGIO BRAIN (W)AW IC        EXAM:  CT ANGIO NECK (W)AW IC        EXAM:  CT PERFUSION W MAPS IC            PROCEDURE DATE:  2021            INTERPRETATION:  CLINICAL INDICATION: Stroke. Left lower extremity weakness..    TECHNIQUE: CT angiography of the intracranial (head) and extracranial (neck) circulation was performed after contrast administration    Maximal intensity projection images were additionally included and reviewed.    RAPID PERFUSION images were obtained. Color maps were reviewed.    120 mls of Omnipaque 350 was administered intravenously without complication and 80 mls were discarded.    Using a separate workstation, 3-D shaded surface reformations were made of vasculature. 3-D reformations were reviewed and included in interpretation of the official report.    CAROTID STENOSIS REFERENCE: (NASCET = 100x1-(N/D)). N=greatest narrowing. D=normal distal diameter - MILD = <50% stenosis. - MODERATE = 50-69% stenosis. - SEVERE = 70-89% stenosis. - HAIRLINE/CRITICAL = 90-99% stenosis. - OCCLUDED = 100% stenosis.    COMPARISON: None.    FINDINGS:    CT PERFUSION:    There is no evidence of perfusion mismatch.    HEAD CTA:    The internal carotid arteries demonstrate normal enhancement to the intracranial circulation and Andreafski of [place].    Anterior and middle cerebral arteries demonstrate normal enhancement and symmetric arborization without intraluminal filling defect, stenosis, occlusion, aneurysm or vascular malformation.    The intradural vertebral arteries demonstrate normal enhancement to the vertebrobasilar confluence. Branch vasculature of the posterior circulation are within normal limits. The posterior cerebral arteries demonstrate symmetric enhancement and arborization without evidence for aneurysm, stenosis, occlusion or vascular malformation. The left PCA is fetal in origin and unremarkable.    Visualized dural venous sinuses and deep cerebral venous structures demonstrate normal enhancement without evidence for filling defect.    NECKCTA:    The aortic arch and origins of the great vessels are within normal limits.    Right:  The origin of the right common carotid artery is normal. The right common carotid artery is normal in course and caliber to the carotid bifurcation. Originsof the internal and external carotid arteries are normal by NASCET criteria. The right internal carotid artery has normal course and caliber to the intracranial circulation.    The origin of the right vertebral artery is normal. The right vertebral artery is normal in course and caliber to the intracranial circulation.    Left: The origin of the left common carotid artery is normal. The left common carotid artery is normal in course and caliber to the carotid bifurcation. Origins of the internal and external carotid arteries are normal by NASCET criteria. The left internal carotid artery has normal course and caliber to the intracranial circulation.    The origin of the left vertebral artery is normal. The left vertebral artery is normal in course and caliber to the intracranial circulation.    IMPRESSION:    CT PERFUSION:  No evidence of perfusion abnormality.    CTA HEAD:  No evidence of flow-limiting stenosis, occlusion or aneurysm.    CTA NECK:  No evidence of carotid or vertebral artery stenosis.    --- End of Report ---              ROSEMARY JUARES MD; Attending Radiologist  This document has been electronically signed. Sep  2 2021  3:29PM (21 @ 14:58)         7. Relevant blood tests:      8. Relevant cardiac rhythm monitorin. Relevant Cardiac Structure: (TTE/AFUA +/-):[ ]No intracardiac thrombus/[ ] no vegetation/[ ]no [place]/EF:    Home Medications:      MEDICATIONS  (STANDING):  lactated ringers Bolus 1000 milliLiter(s) IV Bolus once  metoclopramide Injectable 10 milliGRAM(s) IV Push once      10. PT/OT/Speech/Rehab/S&Sw/ Cognitive eval results and recommendations:    11. Exam:    Vital Signs Last 24 Hrs  T(C): 36.1 (03 Sep 2021 08:33), Max: 37.2 (02 Sep 2021 14:28)  T(F): 97 (03 Sep 2021 08:33), Max: 98.9 (02 Sep 2021 14:28)  HR: 72 (03 Sep 2021 08:33) (50 - 84)  BP: 115/57 (03 Sep 2021 08:33) (100/60 - 154/81)  BP(mean): 75 (02 Sep 2021 17:57) (75 - 75)  RR: 16 (03 Sep 2021 08:33) (16 - 18)  SpO2: 97% (03 Sep 2021 08:33) (97% - 100%)    12.   NIH STROKE SCALE  Item	                                                        Score  1 a.	Level of Consciousness	               	0  1 b. LOC Questions	                                0  1 c.	LOC Commands	                               	0  2.	Best Gaze	                                        0  3.	Visual	                                                0  4.	Facial Palsy	                                        0  5 a.	Motor Arm - Left	                                0  5 b.	Motor Arm - Right	                        0  6 a.	Motor Leg - Left	                                0  6 b.	Motor Leg - Right	                                0  7.	Limb Ataxia	                                        0  8.	Sensory	                                                0  9.	Language	                                        0  10.	Dysarthria	                                        0  11.	Extinction and Inattention  	        0  ______________________________________  TOTAL	                                                        0    Total NIHSS on admission:      NIHSS yesterday:      NIHSS today: 0    mRS:  0 No symptoms at all  1 No significant disability despite symptoms; able to carry out all usual duties and activities without assistance  2 Slight disability; unable to carry out all previous activities, but able to look after own affairs  3 Moderate disability; requiring some help, but able to walk without assistance  4 Moderately severe disability; unable to walk without assistance and unable to attend to own bodily needs without assistance  5 Severe disability; bedridden, incontinent and requiring constant nursing care and attention  6 Dead      13. Impression:32 y/o F was brought to ED for left sided numbness. S/p code stroke, TPA not given as NIH is low (0), non-debilitating symptom. She has a new onset headache, associated with nausea. Likely complex migraine. Currently improving ss,  pending brain MRI.      - MRI of brain w/wo contrast.  - If negative, outpatient neurology f/u        Discussed with Dr. Ugarte

## 2021-09-03 NOTE — ED CDU PROVIDER DISPOSITION NOTE - PATIENT PORTAL LINK FT
You can access the FollowMyHealth Patient Portal offered by [facility] by registering at the following website: [email or web address]. By joining [facility]’s FollowMyHealth portal, you will also be able to view your health information using other applications (apps) compatible with our system.

## 2021-09-03 NOTE — ED CDU PROVIDER DISPOSITION NOTE - CLINICAL COURSE
Attending Note: 30 y/o F presented to ED with HA, nausea, and tingling in the left upper extremity. Stroke code called upon arrival due to sensory deficits in the arm which is subjective. Here CT brain, perfusion, angio WNL, XR nl, EKG nl, labs grossly unremarkable. MRI of the brain with and without contrast wnl, pt dc outpt neuro f/u

## 2021-09-03 NOTE — ED CDU PROVIDER DISPOSITION NOTE - NSFOLLOWUPCLINICS_GEN_ALL_ED_FT
[facility]  Neurology  [address] [place] [address], [facility] [address]  [place] [address]  Phone: [phone]  Fax:   Follow Up Time: 4-6 Days

## 2021-11-04 ENCOUNTER — NON-APPOINTMENT (OUTPATIENT)
Age: 31
End: 2021-11-04

## 2022-09-13 NOTE — ED ADULT NURSE NOTE - NS ED NURSE DC INFO COMPLEXITY
13-Sep-2022 16:30 Simple: Patient demonstrates quick and easy understanding/Verbalized Understanding

## 2022-11-09 NOTE — ED ADULT NURSE NOTE - CHPI ED NUR SEVERITY2
Received pt swaddled in bassinet on Room air. Temp stable throughout shift. No A/B/D episodes. Pt with nasal congestion, suctioned before feeds and PRN. Pt voiding and stooling. Abdominal girth stable. Small diaper rash, zinc applied PRN. Pt tolerates all PO/NG feedings well. PO attempts made when pt alert and showing cues however pt fatigues quickly and requires remainder to be given via NG. Pt mother at bedside for evening cares. Held/fed pt. Updated on POC and all questions answered at this time. PAIN SCALE 6 OF 10.

## 2022-12-15 NOTE — ED PROVIDER NOTE - NSTIMEPROVIDERCAREINITIATE_GEN_ER
12-Aug-2021 00:00 Valtrex Pregnancy And Lactation Text: this medication is Pregnancy Category B and is considered safe during pregnancy. This medication is not directly found in breast milk but it's metabolite acyclovir is present.

## 2023-04-21 NOTE — ED CDU PROVIDER INITIAL DAY NOTE - PSH
No significant past surgical history     Also another warning sign of my stresses would be that I would think too much and get these head aches.

## 2024-01-30 ENCOUNTER — EMERGENCY (EMERGENCY)
Facility: HOSPITAL | Age: 34
LOS: 0 days | Discharge: HOME | End: 2024-01-30
Attending: EMERGENCY MEDICINE
Payer: COMMERCIAL

## 2024-01-30 VITALS
RESPIRATION RATE: 18 BRPM | HEIGHT: 66 IN | HEART RATE: 84 BPM | OXYGEN SATURATION: 100 % | WEIGHT: 169.98 LBS | DIASTOLIC BLOOD PRESSURE: 72 MMHG | SYSTOLIC BLOOD PRESSURE: 161 MMHG | TEMPERATURE: 98 F

## 2024-01-30 DIAGNOSIS — Z91.013 ALLERGY TO SEAFOOD: ICD-10-CM

## 2024-01-30 DIAGNOSIS — Z91.040 LATEX ALLERGY STATUS: ICD-10-CM

## 2024-01-30 DIAGNOSIS — R22.1 LOCALIZED SWELLING, MASS AND LUMP, NECK: ICD-10-CM

## 2024-01-30 DIAGNOSIS — Z88.2 ALLERGY STATUS TO SULFONAMIDES: ICD-10-CM

## 2024-01-30 PROCEDURE — 99283 EMERGENCY DEPT VISIT LOW MDM: CPT

## 2024-01-30 RX ORDER — CEPHALEXIN 500 MG
1 CAPSULE ORAL
Qty: 40 | Refills: 0
Start: 2024-01-30 | End: 2024-02-08

## 2024-01-30 NOTE — ED PROVIDER NOTE - CLINICAL SUMMARY MEDICAL DECISION MAKING FREE TEXT BOX
Patient presented with left neck and face swelling since earlier today.  Patient has had URI symptoms for the past few days, and also had facial trauma several days ago when she hit her head against the refrigerator.  Otherwise on arrival, patient afebrile, hemodynamically stable. No acute respiratory distress on RA. Lungs clear. No meningeal signs or petechiae/rash, no concern for strep pharyngitis based on centor criteria, neuro intact, TMs clear, abdomen non-tender. (+) Area of erythema to the L cheek but no fluctuance. Patient stable on RA, and able to ambulate without difficulty or desaturation. Likely viral etiology but with possibility of cellulitis to facial area, will prescribe antibiotics and outpatient follow up. Patient agreeable with plan. Agrees to return to ED immediately for any new or worsening symptoms.

## 2024-01-30 NOTE — ED PROVIDER NOTE - NSFOLLOWUPINSTRUCTIONS_ED_ALL_ED_FT
Please make sure to follow up with your primary care doctor in 3 days.    Our Emergency Department Referral Coordinators will be reaching out ot you in the next 24-48 hours from 9:00am to 5:00pm (Monday to Friday) with a follow up appointment. Please expect a phone call from the hospital in that time frame. If you do not receive a call or if you have any questions or concerns, you can reach them at [phone].    Please make sure to monitor your symptoms and go to the nearest emergency department if you are experiencing worsening pain, shortness of breath, difficulty swallowing, or fever.

## 2024-01-30 NOTE — ED ADULT TRIAGE NOTE - CHIEF COMPLAINT QUOTE
PT states she hit her head on the handle of refrigerator on Friday, and last night she started noticing swelling on the left side of her face neck and collarbone, pt reports increase stiffness in the neck and shoulder.  pt reports having the flu about a month ago.

## 2024-01-30 NOTE — ED PROVIDER NOTE - PHYSICAL EXAMINATION
CONSTITUTIONAL: in no apparent distress.   HEAD: Normocephalic; atraumatic.   EYES: Pupils are round and reactive, extra-ocular muscles are intact. Eyelids are normal in appearance without swelling or lesions.   ENT: Mild submandibular lymphadenopathy noticed. No cavity noticed in teeth. External ears are non-tender and without swelling or erythema. Ear canals are clear without discharge bilaterally or tenderness to palpation. The tympanic membranes are normal in appearance. Hearing is intact with good acuity to spoken voice. Oral mucosa is pink and moist. The pharynx is normal in appearance without tonsillar swelling or exudates.  Patient is speaking clearly, not muffled and airway is intact.  RESPIRATORY: No signs of respiratory distress. Lung sounds are clear in all lobes bilaterally without rales, rhonchi, or wheezes.  CARDIOVASCULAR: Regular rate and rhythm.   GI: Abdomen is soft, non-tender, and without distention. Bowel sounds are present and normoactive in all four quadrants. No masses are noted.   NEURO: A & O x 3. Normal speech. No focal deficit.  PSYCHOLOGICAL: Appropriate mood and affect. Good judgement and insight.

## 2024-01-30 NOTE — ED PROVIDER NOTE - PATIENT PORTAL LINK FT
You can access the FollowMyHealth Patient Portal offered by [facility] by registering at the following website: [email or web address]. By joining [facility]’s FollowMyHealth portal, you will also be able to view your health information using other applications (apps) compatible with our system.

## 2024-01-30 NOTE — ED PROVIDER NOTE - PROGRESS NOTE DETAILS
Patient is stable for discharge.  Will have patient follow-up with ENT and concussion clinic outpatient.  Will send antibiotics to pharmacy.  Patient is stable for discharge.

## 2024-01-30 NOTE — ED PROVIDER NOTE - OBJECTIVE STATEMENT
33-year-old female with past medical history of venous stasis who presents to the ED for evaluation.  Reports that she has been having swollen in the left side of her neck and face since earlier today that she noticed, so came to the ED for evaluation.  Reports that she is also having URI symptoms since a few days ago as well.  Reports that she excellently hit her head on refrigerator a few days ago and is also wondering if that was the cause for the swelling.  Denies fever, shortness of breath, chest pain, nausea, vomiting, abdominal pain, any urinary symptoms.

## 2024-02-03 ENCOUNTER — EMERGENCY (EMERGENCY)
Facility: HOSPITAL | Age: 34
LOS: 0 days | Discharge: HOME | End: 2024-02-04
Attending: STUDENT IN AN ORGANIZED HEALTH CARE EDUCATION/TRAINING PROGRAM
Payer: COMMERCIAL

## 2024-02-03 VITALS
HEIGHT: 66 IN | HEART RATE: 87 BPM | SYSTOLIC BLOOD PRESSURE: 149 MMHG | RESPIRATION RATE: 18 BRPM | TEMPERATURE: 98 F | WEIGHT: 169.98 LBS | DIASTOLIC BLOOD PRESSURE: 82 MMHG

## 2024-02-03 DIAGNOSIS — M54.2 CERVICALGIA: ICD-10-CM

## 2024-02-03 DIAGNOSIS — R51.9 HEADACHE, UNSPECIFIED: ICD-10-CM

## 2024-02-03 DIAGNOSIS — Z91.013 ALLERGY TO SEAFOOD: ICD-10-CM

## 2024-02-03 DIAGNOSIS — Z88.2 ALLERGY STATUS TO SULFONAMIDES: ICD-10-CM

## 2024-02-03 DIAGNOSIS — Z91.040 LATEX ALLERGY STATUS: ICD-10-CM

## 2024-02-03 PROCEDURE — 99053 MED SERV 10PM-8AM 24 HR FAC: CPT

## 2024-02-03 PROCEDURE — 72125 CT NECK SPINE W/O DYE: CPT | Mod: MA

## 2024-02-03 PROCEDURE — 99284 EMERGENCY DEPT VISIT MOD MDM: CPT

## 2024-02-03 PROCEDURE — 99284 EMERGENCY DEPT VISIT MOD MDM: CPT | Mod: 25

## 2024-02-03 PROCEDURE — 96372 THER/PROPH/DIAG INJ SC/IM: CPT

## 2024-02-03 PROCEDURE — 70450 CT HEAD/BRAIN W/O DYE: CPT | Mod: MA

## 2024-02-03 NOTE — ED ADULT TRIAGE NOTE - CHIEF COMPLAINT QUOTE
Pt c/o pains on the left side of the face., exp the neck, she is on antibiotics for sinus without any improvement. c// O nasea

## 2024-02-04 PROCEDURE — 70450 CT HEAD/BRAIN W/O DYE: CPT | Mod: 26,MA

## 2024-02-04 PROCEDURE — 72125 CT NECK SPINE W/O DYE: CPT | Mod: 26,MA

## 2024-02-04 RX ORDER — KETOROLAC TROMETHAMINE 30 MG/ML
30 SYRINGE (ML) INJECTION ONCE
Refills: 0 | Status: DISCONTINUED | OUTPATIENT
Start: 2024-02-04 | End: 2024-02-04

## 2024-02-04 RX ADMIN — Medication 30 MILLIGRAM(S): at 01:25

## 2024-02-04 NOTE — ED PROVIDER NOTE - NS ED ATTENDING STATEMENT MOD
Reordered with corrections  
To Dr. Andrew, please advise. Thanks.  
We received order for CT LUNG CANCER SCREENING (LCS) LOW DOSE WO CONTRAST    but according to the screening questions, patient does not meet the requirement for the LCS program. See below:    Question Answer   Is patient 50-77 yrs old? Yes   Note: All answers must = YES to meet Medicare screening guidelines   Is this the first (baseline) CT or an annual exam? Annual   Does the patient show any signs or symptoms of lung cancer? No   Is the patient a current or former smoker? Former   Number of years since quit smoking 15   Does patient have history of 20-pack years or more? Yes   Note: All answers must = YES to meet Medicare screening guidelines   Number of Pack Years 34   INITIAL SCREENING ONLY: Patient has had a shared decision-making visit, including discussion re: risks/benefits and smoking cessation No   Note: All answers must = YES to meet Medicare screening guideline   Has patient been counseled on smoking cessation resources? No   Note: All answers must = YES to meet Medicare screening guidelines   Is there documentation of shared decision making? Yes   High radon exposure? No       
This was a shared visit with the MARIO. I reviewed and verified the documentation.

## 2024-02-04 NOTE — ED PROVIDER NOTE - PATIENT PORTAL LINK FT
You can access the FollowMyHealth Patient Portal offered by [facility] by registering at the following website: [email or web address]. By joining [employer]’s FollowMyHealth portal, you will also be able to view your health information using other applications (apps) compatible with our system.

## 2024-02-04 NOTE — ED PROVIDER NOTE - NSFOLLOWUPINSTRUCTIONS_ED_ALL_ED_FT
Atypical Facial Pain    WHAT YOU NEED TO KNOW:    Atypical facial pain usually occurs on one side of your face. The pain is often constant, and may be aching, burning, throbbing, or stabbing. The pain may be felt in your nose, eye, cheek, temple, and jaw. You may also have headaches.    DISCHARGE INSTRUCTIONS:    Contact your healthcare provider if:   •Your symptoms get worse, or you develop new symptoms.       •You have questions or concerns about your condition or care.      Medicines:   •Medicines such as antidepressants, antiseizure medicines, or muscle relaxers may be used to decrease pain.      •Take your medicine as directed. Contact your healthcare provider if you think your medicine is not helping or if you have side effects. Tell him of her if you are allergic to any medicine. Keep a list of the medicines, vitamins, and herbs you take. Include the amounts, and when and why you take them. Bring the list or the pill bottles to follow-up visits. Carry your medicine list with you in case of an emergency.      Follow up with your doctor as directed: Write down your questions so you remember to ask them during your visits.         Musculoskeletal Pain    Musculoskeletal pain is muscle and bone aches and pains. This pain can occur in any part of the body.    Follow these instructions at home:  Only take medicines for pain, discomfort, or fever as told by your health care provider.  You may continue all activities unless the activities cause more pain. When the pain lessens, slowly resume normal activities. Gradually increase the intensity and duration of the activities or exercise.  During periods of severe pain, bed rest may be helpful. Lie or sit in any position that is comfortable, but get out of bed and walk around at least every several hours.  ImageIf directed, put ice on the injured area.    Put ice in a plastic bag.  Place a towel between your skin and the bag.  Leave the ice on for 20 minutes, 2–3 times a day.    Contact a health care provider if:  Your pain is getting worse.  Your pain is not relieved with medicines.  You lose function in the area of the pain if the pain is in your arms, legs, or neck.  This information is not intended to replace advice given to you by your health care provider. Make sure you discuss any questions you have with your health care provider.

## 2024-02-04 NOTE — ED PROVIDER NOTE - CLINICAL SUMMARY MEDICAL DECISION MAKING FREE TEXT BOX
33-year-old female presents today with neck pain, facial pain and headache.  Patient is hemodynamically stable and neurologically intact at this time.  Patient symptoms could be secondary to TMJ versus trigeminal neuralgia versus recent head injury.  Patient had improvement in symptomatology during ED stay and was discharged to close follow-up with neurology.  Return precautions explained to patient.

## 2024-02-04 NOTE — ED PROVIDER NOTE - OBJECTIVE STATEMENT
33 yold female to ED with no signif med hx c/o headache, left side facial pain and neck pain x 10 days; pt seen initially s/p hitting her head on refrigator no loc; dx with mild concussion; seen again in local ucc and dx with sinusitis - given biaxin; pt presents for same facial pain; pt denies fever, chills, n/v, numbness, tingling or weakness;

## 2024-02-04 NOTE — ED PROVIDER NOTE - PROGRESS NOTE DETAILS
prelim ct head,neck negative; d/w pt and joint decision made to d/c pt home; will call pt back if reading changes;

## 2024-02-04 NOTE — ED PROVIDER NOTE - CARE PROVIDER_API CALL
Andres Henriquez  Nurse Anesthetist  [address] [place] [id number]  Phone: [phone]  Fax: [phone]  Established Patient  Follow Up Time: 1-3 Days

## 2024-02-04 NOTE — ED PROVIDER NOTE - PHYSICAL EXAMINATION
Constitutional: Well developed, well nourished. NAD  Head: Normocephalic, atraumatic.  Eyes: PERRL, EOMI.  ENT: No nasal discharge. Mucous membranes dry. + mild left side facial tenderness; no swelling; flex/ext neck intact  Neck: Supple. Painless ROM.  Cardiovascular:  Regular rate and rhythm.   Pulmonary:   Lungs clear to auscultation bilaterally.   Abdominal: Soft. Nondistended. No rebound, guarding, rigidity.  Extremities. Pelvis stable. No lower extremity edema, symmetric calves.  Skin: No rashes, cyanosis.  Neuro: AAOx3. No focal neurological deficits.  Psych: Normal mood. Normal affect.

## 2024-02-04 NOTE — ED ADULT NURSE NOTE - OBJECTIVE STATEMENT
Pt c/o LT sided head, sinus pain and nausea. Pt states she hit her head on a door handle several days ago and has been in pain ever since. Pt states she also feels sinus pressure/pain on LT side, however no other sinus symptoms.

## 2024-02-07 ENCOUNTER — EMERGENCY (EMERGENCY)
Facility: HOSPITAL | Age: 34
LOS: 0 days | Discharge: HOME | End: 2024-02-07
Attending: EMERGENCY MEDICINE
Payer: COMMERCIAL

## 2024-02-07 VITALS
RESPIRATION RATE: 18 BRPM | SYSTOLIC BLOOD PRESSURE: 184 MMHG | HEIGHT: 66 IN | HEART RATE: 112 BPM | DIASTOLIC BLOOD PRESSURE: 114 MMHG | TEMPERATURE: 96 F | OXYGEN SATURATION: 100 %

## 2024-02-07 VITALS
HEART RATE: 108 BPM | OXYGEN SATURATION: 100 % | RESPIRATION RATE: 18 BRPM | SYSTOLIC BLOOD PRESSURE: 156 MMHG | DIASTOLIC BLOOD PRESSURE: 75 MMHG

## 2024-02-07 DIAGNOSIS — Z91.013 ALLERGY TO SEAFOOD: ICD-10-CM

## 2024-02-07 DIAGNOSIS — R20.0 ANESTHESIA OF SKIN: ICD-10-CM

## 2024-02-07 DIAGNOSIS — R51.9 HEADACHE, UNSPECIFIED: ICD-10-CM

## 2024-02-07 DIAGNOSIS — R20.2 PARESTHESIA OF SKIN: ICD-10-CM

## 2024-02-07 DIAGNOSIS — Z91.040 LATEX ALLERGY STATUS: ICD-10-CM

## 2024-02-07 DIAGNOSIS — Z88.2 ALLERGY STATUS TO SULFONAMIDES: ICD-10-CM

## 2024-02-07 LAB
ALBUMIN SERPL ELPH-MCNC: 4.6 G/DL — SIGNIFICANT CHANGE UP (ref 3.5–5.2)
ALP SERPL-CCNC: 81 U/L — SIGNIFICANT CHANGE UP (ref 30–115)
ALT FLD-CCNC: 23 U/L — SIGNIFICANT CHANGE UP (ref 0–41)
ANION GAP SERPL CALC-SCNC: 16 MMOL/L — HIGH (ref 7–14)
AST SERPL-CCNC: 18 U/L — SIGNIFICANT CHANGE UP (ref 0–41)
BASOPHILS # BLD AUTO: 0.05 K/UL — SIGNIFICANT CHANGE UP (ref 0–0.2)
BASOPHILS NFR BLD AUTO: 0.3 % — SIGNIFICANT CHANGE UP (ref 0–1)
BILIRUB SERPL-MCNC: 0.5 MG/DL — SIGNIFICANT CHANGE UP (ref 0.2–1.2)
BUN SERPL-MCNC: 12 MG/DL — SIGNIFICANT CHANGE UP (ref 10–20)
CALCIUM SERPL-MCNC: 9.9 MG/DL — SIGNIFICANT CHANGE UP (ref 8.4–10.5)
CHLORIDE SERPL-SCNC: 102 MMOL/L — SIGNIFICANT CHANGE UP (ref 98–110)
CO2 SERPL-SCNC: 19 MMOL/L — SIGNIFICANT CHANGE UP (ref 17–32)
CREAT SERPL-MCNC: 1.1 MG/DL — SIGNIFICANT CHANGE UP (ref 0.7–1.5)
EGFR: 68 ML/MIN/1.73M2 — SIGNIFICANT CHANGE UP
EOSINOPHIL # BLD AUTO: 0.27 K/UL — SIGNIFICANT CHANGE UP (ref 0–0.7)
EOSINOPHIL NFR BLD AUTO: 1.8 % — SIGNIFICANT CHANGE UP (ref 0–8)
GLUCOSE BLDC GLUCOMTR-MCNC: 100 MG/DL — HIGH (ref 70–99)
GLUCOSE SERPL-MCNC: 112 MG/DL — HIGH (ref 70–99)
HCT VFR BLD CALC: 44.3 % — SIGNIFICANT CHANGE UP (ref 37–47)
HGB BLD-MCNC: 15.1 G/DL — SIGNIFICANT CHANGE UP (ref 12–16)
IMM GRANULOCYTES NFR BLD AUTO: 0.3 % — SIGNIFICANT CHANGE UP (ref 0.1–0.3)
LYMPHOCYTES # BLD AUTO: 24.4 % — SIGNIFICANT CHANGE UP (ref 20.5–51.1)
LYMPHOCYTES # BLD AUTO: 3.71 K/UL — HIGH (ref 1.2–3.4)
MAGNESIUM SERPL-MCNC: 2 MG/DL — SIGNIFICANT CHANGE UP (ref 1.8–2.4)
MCHC RBC-ENTMCNC: 29.3 PG — SIGNIFICANT CHANGE UP (ref 27–31)
MCHC RBC-ENTMCNC: 34.1 G/DL — SIGNIFICANT CHANGE UP (ref 32–37)
MCV RBC AUTO: 86 FL — SIGNIFICANT CHANGE UP (ref 81–99)
MONOCYTES # BLD AUTO: 0.92 K/UL — HIGH (ref 0.1–0.6)
MONOCYTES NFR BLD AUTO: 6 % — SIGNIFICANT CHANGE UP (ref 1.7–9.3)
NEUTROPHILS # BLD AUTO: 10.21 K/UL — HIGH (ref 1.4–6.5)
NEUTROPHILS NFR BLD AUTO: 67.2 % — SIGNIFICANT CHANGE UP (ref 42.2–75.2)
NRBC # BLD: 0 /100 WBCS — SIGNIFICANT CHANGE UP (ref 0–0)
PLATELET # BLD AUTO: 278 K/UL — SIGNIFICANT CHANGE UP (ref 130–400)
PMV BLD: 10.7 FL — HIGH (ref 7.4–10.4)
POTASSIUM SERPL-MCNC: 4 MMOL/L — SIGNIFICANT CHANGE UP (ref 3.5–5)
POTASSIUM SERPL-SCNC: 4 MMOL/L — SIGNIFICANT CHANGE UP (ref 3.5–5)
PROT SERPL-MCNC: 8.3 G/DL — HIGH (ref 6–8)
RBC # BLD: 5.15 M/UL — SIGNIFICANT CHANGE UP (ref 4.2–5.4)
RBC # FLD: 12.5 % — SIGNIFICANT CHANGE UP (ref 11.5–14.5)
SODIUM SERPL-SCNC: 137 MMOL/L — SIGNIFICANT CHANGE UP (ref 135–146)
WBC # BLD: 15.21 K/UL — HIGH (ref 4.8–10.8)
WBC # FLD AUTO: 15.21 K/UL — HIGH (ref 4.8–10.8)

## 2024-02-07 PROCEDURE — 83735 ASSAY OF MAGNESIUM: CPT

## 2024-02-07 PROCEDURE — 80053 COMPREHEN METABOLIC PANEL: CPT

## 2024-02-07 PROCEDURE — 99284 EMERGENCY DEPT VISIT MOD MDM: CPT

## 2024-02-07 PROCEDURE — 99283 EMERGENCY DEPT VISIT LOW MDM: CPT

## 2024-02-07 PROCEDURE — 85025 COMPLETE CBC W/AUTO DIFF WBC: CPT

## 2024-02-07 PROCEDURE — 82962 GLUCOSE BLOOD TEST: CPT

## 2024-02-07 PROCEDURE — 36415 COLL VENOUS BLD VENIPUNCTURE: CPT

## 2024-02-07 RX ORDER — DIPHENHYDRAMINE HCL 50 MG
25 CAPSULE ORAL ONCE
Refills: 0 | Status: COMPLETED | OUTPATIENT
Start: 2024-02-07 | End: 2024-02-07

## 2024-02-07 RX ORDER — SODIUM CHLORIDE 9 MG/ML
1000 INJECTION INTRAMUSCULAR; INTRAVENOUS; SUBCUTANEOUS ONCE
Refills: 0 | Status: COMPLETED | OUTPATIENT
Start: 2024-02-07 | End: 2024-02-07

## 2024-02-07 RX ORDER — DEXAMETHASONE 0.5 MG/5ML
10 ELIXIR ORAL ONCE
Refills: 0 | Status: COMPLETED | OUTPATIENT
Start: 2024-02-07 | End: 2024-02-07

## 2024-02-07 RX ORDER — DIAZEPAM 5 MG
5 TABLET ORAL ONCE
Refills: 0 | Status: DISCONTINUED | OUTPATIENT
Start: 2024-02-07 | End: 2024-02-07

## 2024-02-07 RX ADMIN — Medication 5 MILLIGRAM(S): at 03:17

## 2024-02-07 RX ADMIN — SODIUM CHLORIDE 1000 MILLILITER(S): 9 INJECTION INTRAMUSCULAR; INTRAVENOUS; SUBCUTANEOUS at 02:03

## 2024-02-07 RX ADMIN — Medication 10 MILLIGRAM(S): at 02:50

## 2024-02-07 RX ADMIN — Medication 25 MILLIGRAM(S): at 02:03

## 2024-02-07 NOTE — ED PROVIDER NOTE - TEST CONSIDERED BUT NOT PERFORMED
Tests Considered But Not Performed Pt neurologically intact  had normal  CT 2 days ago. no new trauma.

## 2024-02-07 NOTE — ED PROVIDER NOTE - NSFOLLOWUPINSTRUCTIONS_ED_ALL_ED_FT
Follow up with your neurologist Dr Parikh.   Obtain  your outpatient MRI     RETURN TO ED  FOR ANY NEW WORSENING OR CONCERNING SYMPTOMS TO YOU, ALSO AS WE DISCUSSED. WE ARE HERE AND HAPPY TO TAKE CARE OF YOU      General Headache Without Cause  ImageA headache is pain or discomfort felt around the head or neck area. There are many causes and types of headaches. In some cases, the cause may not be found.    Follow these instructions at home:  Managing pain     Take over-the-counter and prescription medicines only as told by your doctor.  Lie down in a dark, quiet room when you have a headache.  If directed, apply ice to the head and neck area:    Put ice in a plastic bag.  Place a towel between your skin and the bag.  Leave the ice on for 20 minutes, 2–3 times per day.    Use a heating pad or hot shower to apply heat to the head and neck area as told by your doctor.  Keep lights dim if bright lights bother you or make your headaches worse.  Eating and drinking     Eat meals on a regular schedule.  Lessen how much alcohol you drink.  Lessen how much caffeine you drink, or stop drinking caffeine.  General instructions     Keep all follow-up visits as told by your doctor. This is important.  Keep a journal to find out if certain things bring on headaches. For example, write down:    What you eat and drink.  How much sleep you get.  Any change to your diet or medicines.    Relax by getting a massage or doing other relaxing activities.  Lessen stress.  Sit up straight. Do not tighten (tense) your muscles.  Do not use tobacco products. This includes cigarettes, chewing tobacco, or e-cigarettes. If you need help quitting, ask your doctor.  Exercise regularly as told by your doctor.  Get enough sleep. This often means 7–9 hours of sleep.  Contact a doctor if:  Your symptoms are not helped by medicine.  You have a headache that feels different than the other headaches.  You feel sick to your stomach (nauseous) or you throw up (vomit).  You have a fever.  Get help right away if:  Your headache becomes really bad.  You keep throwing up.  You have a stiff neck.  You have trouble seeing.  You have trouble speaking.  You have pain in the eye or ear.  Your muscles are weak or you lose muscle control.  You lose your balance or have trouble walking.  You feel like you will pass out (faint) or you pass out.  You have confusion.  This information is not intended to replace advice given to you by your health care provider. Make sure you discuss any questions you have with your health care provider.

## 2024-02-07 NOTE — ED PROVIDER NOTE - PATIENT PORTAL LINK FT
You can access the FollowMyHealth Patient Portal offered by [facility] by registering at the following website: [email or web address]. By joining [employer]’s FollowMyHealth portal, you will also be able to view your health information using other applications (apps) compatible with our system.

## 2024-02-07 NOTE — ED ADULT NURSE REASSESSMENT NOTE - NS ED NURSE REASSESS COMMENT FT1
PT A and O x 4. Tolerated PO intake and ambulated with bathroom with minimal assistance. Parent at bedside

## 2024-02-07 NOTE — ED ADULT NURSE REASSESSMENT NOTE - NS ED NURSE REASSESS COMMENT FT1
Patient refusing to ambulate. PT stated she need to go to the bathroom. PT requested bedpan. PCA and RN @ bedside to assist with ADL. PT was able to straighten legs lift self onto bedpan and self adjust for use.

## 2024-02-07 NOTE — ED PROVIDER NOTE - PROGRESS NOTE DETAILS
Pt is yelling and cursing  that she wants an MRI now, called me an "asshole"  for not sending  her [place] for an [place]. I explained that on my evaluation patient is neurologically intact ,  she does not show signs of  neurologic emergency indication need for emergent MRI. Pt saw her neurologist today and plan is for outpatient  MRI. Urged to  comply with her specialist  recommendations and follow up.

## 2024-02-07 NOTE — ED PROVIDER NOTE - OBJECTIVE STATEMENT
33 year old Female past medical history of Vit-B12 def, Vit-D def, Pelvic infection, anxiety, over past few weeks has had left sided facial and headaches, seen by neurology today at Dr loya office prescribed nurtec - took it at 1pm. Tonight pt felt anxious and felt swelling to right eye and paresthesias of mouth - concerned she was having an allergic reaction so came to ED.  no rash , no angioedema, no paresthesia numbness weakness of extremities, no vision changes  headache improved after taking nurtec. Of note patient has  had normal CTs  CTA  of head and MRI  of head in the past
normal...

## 2024-02-07 NOTE — ED PROVIDER NOTE - CLINICAL SUMMARY MEDICAL DECISION MAKING FREE TEXT BOX
33yF Vit-B12 def, Vit-D def, Pelvic infection, anxiety ,  over past few weeks has had  left sided facial  headaches, seen by neurology  today  Dr loya office prescribed nurtec - took it at 1pm -  tonight pt felt anxious  and felt swelling to right eye and paresthesias of mouth - concerned she was  having an allergic reaction so came to ED>  no rash , no angioedema, no paresthesia numbness weakness of extremities, no vision changes  headache improved after taking nurtec. Of note patient has  had normal CTs  CTA  of head and MRI  of head in the past   Alert and oriented.  CN 2-12 intact.  Motor strength and sensory response is symmetric.  CB intact. PERRL, EOMI, no rash  no  swelling,   labs wnl  symptomatic treatment given 33yF Vit-B12 def, Vit-D def, Pelvic infection, anxiety ,  over past few weeks has had  left sided facial  headaches, seen by neurology  today  Dr loya office prescribed nurtec ,  given script for outpt MRI . Pt took Nurtec at 1pm -  tonight pt felt anxious  and felt swelling to right eye and paresthesias of mouth - concerned she was  having an allergic reaction so came to ED>  no rash , no angioedema, no paresthesia numbness weakness of extremities, no vision changes  headache improved after taking nurtec. Of note patient has  had normal CTs  CTA  of head and MRI  of head in the past   Alert and oriented.  CN 2-12 intact.  Motor strength and sensory response is symmetric. .  Pt ambulating in ED PERRL, EOMI, no rash  no  swelling,   labs wnl  symptomatic treatment given

## 2024-02-07 NOTE — ED PROVIDER NOTE - CCCP TRG CHIEF CMPLNT
Detail Level: Detailed
Add 70280 Cpt? (Important Note: In 2017 The Use Of 73444 Is Being Tracked By Cms To Determine Future Global Period Reimbursement For Global Periods): yes
facial numbness

## 2024-02-08 ENCOUNTER — EMERGENCY (EMERGENCY)
Facility: HOSPITAL | Age: 34
LOS: 0 days | Discharge: HOME | End: 2024-02-09
Attending: EMERGENCY MEDICINE
Payer: COMMERCIAL

## 2024-02-08 ENCOUNTER — APPOINTMENT (OUTPATIENT)
Dept: OTOLARYNGOLOGY | Facility: CLINIC | Age: 34
End: 2024-02-08

## 2024-02-08 ENCOUNTER — EMERGENCY (EMERGENCY)
Facility: HOSPITAL | Age: 34
LOS: 0 days | Discharge: HOME | End: 2024-02-08
Attending: EMERGENCY MEDICINE
Payer: COMMERCIAL

## 2024-02-08 VITALS
OXYGEN SATURATION: 98 % | TEMPERATURE: 99 F | RESPIRATION RATE: 18 BRPM | WEIGHT: 169.98 LBS | HEIGHT: 66 IN | SYSTOLIC BLOOD PRESSURE: 132 MMHG | DIASTOLIC BLOOD PRESSURE: 76 MMHG | HEART RATE: 101 BPM

## 2024-02-08 VITALS
SYSTOLIC BLOOD PRESSURE: 154 MMHG | DIASTOLIC BLOOD PRESSURE: 81 MMHG | WEIGHT: 154.98 LBS | RESPIRATION RATE: 18 BRPM | HEIGHT: 66 IN | TEMPERATURE: 99 F | HEART RATE: 114 BPM | OXYGEN SATURATION: 99 %

## 2024-02-08 DIAGNOSIS — F41.9 ANXIETY DISORDER, UNSPECIFIED: ICD-10-CM

## 2024-02-08 DIAGNOSIS — G43.909 MIGRAINE, UNSPECIFIED, NOT INTRACTABLE, WITHOUT STATUS MIGRAINOSUS: ICD-10-CM

## 2024-02-08 DIAGNOSIS — R20.0 ANESTHESIA OF SKIN: ICD-10-CM

## 2024-02-08 DIAGNOSIS — R21 RASH AND OTHER NONSPECIFIC SKIN ERUPTION: ICD-10-CM

## 2024-02-08 DIAGNOSIS — Z91.013 ALLERGY TO SEAFOOD: ICD-10-CM

## 2024-02-08 DIAGNOSIS — R51.9 HEADACHE, UNSPECIFIED: ICD-10-CM

## 2024-02-08 DIAGNOSIS — T39.315A ADVERSE EFFECT OF PROPIONIC ACID DERIVATIVES, INITIAL ENCOUNTER: ICD-10-CM

## 2024-02-08 DIAGNOSIS — Z91.040 LATEX ALLERGY STATUS: ICD-10-CM

## 2024-02-08 DIAGNOSIS — Z88.2 ALLERGY STATUS TO SULFONAMIDES: ICD-10-CM

## 2024-02-08 DIAGNOSIS — D72.829 ELEVATED WHITE BLOOD CELL COUNT, UNSPECIFIED: ICD-10-CM

## 2024-02-08 DIAGNOSIS — R10.2 PELVIC AND PERINEAL PAIN: ICD-10-CM

## 2024-02-08 DIAGNOSIS — R06.02 SHORTNESS OF BREATH: ICD-10-CM

## 2024-02-08 DIAGNOSIS — G50.0 TRIGEMINAL NEURALGIA: ICD-10-CM

## 2024-02-08 DIAGNOSIS — H53.149 VISUAL DISCOMFORT, UNSPECIFIED: ICD-10-CM

## 2024-02-08 PROCEDURE — 99283 EMERGENCY DEPT VISIT LOW MDM: CPT

## 2024-02-08 PROCEDURE — 99284 EMERGENCY DEPT VISIT MOD MDM: CPT

## 2024-02-08 RX ORDER — EPINEPHRINE 0.3 MG/.3ML
0.1 INJECTION INTRAMUSCULAR; SUBCUTANEOUS
Qty: 1 | Refills: 0
Start: 2024-02-08

## 2024-02-08 RX ORDER — ACETAMINOPHEN 500 MG
650 TABLET ORAL ONCE
Refills: 0 | Status: COMPLETED | OUTPATIENT
Start: 2024-02-08 | End: 2024-02-08

## 2024-02-08 RX ORDER — DEXAMETHASONE 0.5 MG/5ML
10 ELIXIR ORAL ONCE
Refills: 0 | Status: COMPLETED | OUTPATIENT
Start: 2024-02-08 | End: 2024-02-08

## 2024-02-08 RX ORDER — EPINEPHRINE 0.3 MG/.3ML
0.3 INJECTION INTRAMUSCULAR; SUBCUTANEOUS
Qty: 1 | Refills: 0
Start: 2024-02-08

## 2024-02-08 RX ORDER — GABAPENTIN 400 MG/1
300 CAPSULE ORAL ONCE
Refills: 0 | Status: COMPLETED | OUTPATIENT
Start: 2024-02-08 | End: 2024-02-08

## 2024-02-08 RX ADMIN — Medication 10 MILLIGRAM(S): at 08:46

## 2024-02-08 NOTE — ED ADULT NURSE NOTE - CHPI ED NUR SYMPTOMS POS
----- Message from Michelle Echeverria sent at 11/20/2018 10:31 AM CST -----  Contact: PUSHPA KEY [[id number]]            Name of Who is Calling: PUSHPA KEY [[id number]]      What is the request in detail: Patient would like to schedule follow up visit for a TIA. Please call   Can the clinic reply by [facility]: no     What Number to [facility] Back [facility]: [phone]                                 ANXIETY

## 2024-02-08 NOTE — ED ADULT TRIAGE NOTE - WEIGHT IN LBS
Called patient and conveyed negative urine culture result. Patient reports her symptoms have completely resolved.  
154.9

## 2024-02-08 NOTE — ED PROVIDER NOTE - PATIENT PORTAL LINK FT
You can access the FollowMyHealth Patient Portal offered by [facility] by registering at the following website: [email or web address]. By joining [employer]’s FollowMyHealth portal, you will also be able to view your health information using other applications (apps) compatible with our system.

## 2024-02-08 NOTE — ED PROVIDER NOTE - ATTENDING CONTRIBUTION TO CARE
33-year-old female to ED with concern for allergic reaction from Motrin.  Patient has a history of anxiety and can being worked up for trigeminal neuralgia.  Called ambulance because she was having concern of having pain in her left forehead.  Some numbness around her lips shortness of breath and rash given Benadryl en route to the hospital.  Otherwise no other medical complaints.  Afebrile vital signs stable exam as noted clear lungs bilaterally conjunctiva pink HEENT normal, regular rate and rhythm abdomen soft nontender and neuro nonfocal.

## 2024-02-08 NOTE — ED ADULT TRIAGE NOTE - CHIEF COMPLAINT QUOTE
BIBA, as per EMS, "pt c/o allergic reaction to ibuprofen, swollen lips, rash all over. IM benadryl 50 given in field"

## 2024-02-08 NOTE — ED PROVIDER NOTE - CARE PROVIDER_API CALL
Bret Parikh  Neurology  [address] [place] [address]  Phone: [phone]  Fax: [phone]  Follow Up Time: 1-3 Days

## 2024-02-08 NOTE — ED ADULT NURSE NOTE - SUICIDE SCREENING DEPRESSION
Group Topic:  Group OT    Date: 7/15/2022  Start Time: 1115  End Time: 1200  Facilitators: Catherine S Fahres, OT    Focus: Dysfunctional Beliefs  Number in attendance: 7    Method: Group  : Present  Participation: Moderate  Patient Response: Attentive and Interested in topic  Mood: subdued  Affect: Type: subdued   Range: Blunted/flat   Congruency: Congruent   Stability: Stable  Behavior/Socialization: Cooperative  Thought Process: Tracking  Task Performance: Follows directions  Patient Evaluation: Independent - full participation           Negative

## 2024-02-08 NOTE — ED ADULT TRIAGE NOTE - CHIEF COMPLAINT QUOTE
pt complains of trigeminal neuralgia pain to left side of head and face since today, complains of photophobia pt complains of trigeminal neuralgia pain to left side of head and face since today, complains of photophobia, pt was seen in [facility] ED recently for allergic reaction to motrin and/ or nurtec, and trigeminal neuralgia

## 2024-02-08 NOTE — ED ADULT NURSE NOTE - CHIEF COMPLAINT QUOTE
pt complains of trigeminal neuralgia pain to left side of head and face since today, complains of photophobia, pt was seen in [facility] ED recently for allergic reaction to motrin and/ or nurtec, and trigeminal neuralgia

## 2024-02-08 NOTE — ED PROVIDER NOTE - PROGRESS NOTE DETAILS
pk: pt given decadron to prevent headache that she feels may start soon, will also help prevent rebound allergic reaction, pt dc'd with mom who is able to drive pt home. pt will follow up with Dr. Diaz

## 2024-02-08 NOTE — ED ADULT TRIAGE NOTE - BSA (M2)
1.79 Otezla Counseling: The side effects of Otezla were discussed with the patient, including but not limited to worsening or new depression, weight loss, diarrhea, nausea, upper respiratory tract infection, and headache. Patient instructed to call the office should any adverse effect occur.  The patient verbalized understanding of the proper use and possible adverse effects of Otezla.  All the patient's questions and concerns were addressed.
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
## 2024-02-08 NOTE — ED PROVIDER NOTE - OBJECTIVE STATEMENT
Amrita is a 33-year-old female past medical history of anxiety followed by Dr. Irvin for recurrent headaches, being worked up for trigeminal neuralgia presenting to ED for evaluation of allergic reaction after taking 800 mg of motirn this morning.  Patient states she took motrin 800mg yday afternoon and evening but this morning when she took it she felt numbness around her lips, shortness of breath and a rash to her chest. Pt given 50 mg benadryl en route to the ED and p/w no complaints. Otherwise denies any fever, chills, headache, changes in vision, cough, congestion, cp, palpitations, n/v/d, abd pain, constipation, urinary complaints, lower extremity pain/swelling.

## 2024-02-09 VITALS
HEART RATE: 57 BPM | OXYGEN SATURATION: 98 % | RESPIRATION RATE: 18 BRPM | TEMPERATURE: 98 F | SYSTOLIC BLOOD PRESSURE: 102 MMHG | DIASTOLIC BLOOD PRESSURE: 62 MMHG

## 2024-02-09 RX ORDER — ACETAMINOPHEN 500 MG
2 TABLET ORAL
Qty: 40 | Refills: 0
Start: 2024-02-09 | End: 2024-02-13

## 2024-02-09 RX ORDER — GABAPENTIN 400 MG/1
1 CAPSULE ORAL
Qty: 15 | Refills: 0
Start: 2024-02-09 | End: 2024-02-13

## 2024-02-09 RX ADMIN — Medication 650 MILLIGRAM(S): at 00:12

## 2024-02-09 RX ADMIN — GABAPENTIN 300 MILLIGRAM(S): 400 CAPSULE ORAL at 00:13

## 2024-02-09 NOTE — ED PROVIDER NOTE - PATIENT PORTAL LINK FT
You can access the FollowMyHealth Patient Portal offered by [facility] by registering at the following website: [email or web address]. By joining [employer]’s FollowMyHealth portal, you will also be able to view your health information using other applications (apps) compatible with our system.

## 2024-02-09 NOTE — CHART NOTE - NSCHARTNOTEFT_GEN_A_CORE
left messages 1/31 & 2/1, MV - emailed con clinic on 2/2, MV - 3 messages were left for this patient MV 2/9

## 2024-02-09 NOTE — ED PROVIDER NOTE - PHYSICAL EXAMINATION
CONSTITUTIONAL: NAD  SKIN: Warm dry  HEAD: NCAT  EYES: NL inspection  ENT: MMM  NECK: Supple; non tender.  CARD: RRR  RESP: CTAB  ABD: S/NT no R/G  EXT: no pedal edema  NEURO: Grossly unremarkable, cn 2-12 grossly intact, motor and sensation BL upper and lower limbs intact  PSYCH: Cooperative, appropriate.

## 2024-02-09 NOTE — ED PROVIDER NOTE - ATTENDING CONTRIBUTION TO CARE
I personally evaluated the patient. I reviewed the Resident’s or Physician Assistant’s note (as assigned above), and agree with the findings and plan except as documented in my note.  33-year-old female, being worked up for pelvic pain thought to be trigeminal neuralgia presents for evaluation of the return of her subacute pain.  Patient states that pain is located on her left frontoparietal scalp.  Denies any trauma today.  Also reports associated photophobia.  Denies any nausea, vomiting, dizziness, fever, chest pain shortness of breath abdominal pain.  She has been seen multiple times in the ED and was seen earlier at the [facility] ED and had lab work done which were normal except for mild leukocytosis of 15,000.  VSS, non toxic appearing, NAD, Head NCAT, PERRLA, EOMI, MMM, neck supple, normal ROM, normal s1s2, lungs ctab, abd s/nt/nd, no guarding or rebound, extremities wnl, AAO x 3, GCS 15, CN II to XII, motor or sensation exams. No acute skin lesions. Plan is pain control and reassess.

## 2024-02-09 NOTE — ED PROVIDER NOTE - OBJECTIVE STATEMENT
34 yo f History of migraine headaches and trigeminal neuralgia presents with headache x 1 day.  Patient admits to throbbing and sharp headache that is consistent with the migraine headaches that she has been dealing with for the last few hours.  Patient was recently in the emergency department secondary to an allergic reaction to Motrin, possibly Clarithomycin.  Has been following up with the neurologist Dr. Hernandez and has plan for outpatient MRI on Monday for headaches.  Patient denies numbness tingling weakness trauma, rash shortness of breath difficulty breathing.  Accompanied by boyfriend and mother

## 2024-02-09 NOTE — ED PROVIDER NOTE - PROGRESS NOTE DETAILS
SS Symptoms significantly improved.  Will discharge with medications and patient will follow-up with neurology.  Strict return precautions given with patient and family at bedside

## 2024-02-09 NOTE — ED PROVIDER NOTE - CARE PROVIDER_API CALL
Bret Parikh  Neurology  [address] [place] [address]  Phone: [phone]  Fax: [phone]  Follow Up Time: 1-3 Days

## 2024-02-09 NOTE — ED PROVIDER NOTE - CLINICAL SUMMARY MEDICAL DECISION MAKING FREE TEXT BOX
Patient presented for evaluation and management of her persistent scalp pain.  Was treated with gabapentin and acetaminophen and symptoms improved.  Patient is currently following with neurology and has a pending outpatient brain MRI.  Will be discharged with outpatient follow-up and given his prescription of gabapentin.

## 2024-02-09 NOTE — ED PROVIDER NOTE - NSFOLLOWUPINSTRUCTIONS_ED_ALL_ED_FT
FOLLOW UP WITH YOUR NEUROLOGIST AS DISCUSSED.    ~~~  Migraine Headache  ImageA migraine headache is an intense, throbbing pain on one side or both sides of the head. Migraines may also cause other symptoms, such as nausea, vomiting, and sensitivity to light and noise.    What are the causes?  Doing or taking certain things may also trigger migraines, such as:    Alcohol.  Smoking.  Medicines, such as:    Medicine used to treat chest pain (nitroglycerine).  Birth control pills.  Estrogen pills.  Certain blood pressure medicines.    Aged cheeses, chocolate, or caffeine.  Foods or drinks that contain nitrates, glutamate, aspartame, or tyramine.  Physical activity.    Other things that may trigger a migraine include:    Menstruation.  Pregnancy.  Hunger.  Stress, lack of sleep, too much sleep, or fatigue.  Weather changes.    What increases the risk?  The following factors may make you more likely to experience migraine headaches:    Age. Risk increases with age.  Family history of migraine headaches.  Being .  Depression and anxiety.  Obesity.  Being a woman.  Having a hole in the heart (patent foramen ovale) or other heart problems.    What are the signs or symptoms?  The main symptom of this condition is pulsating or throbbing pain. Pain may:    Happen in any area of the head, such as on one side or both sides.  Interfere with daily activities.  Get worse with physical activity.  Get worse with exposure to bright lights or loud noises.    Other symptoms may include:    Nausea.  Vomiting.  Dizziness.  General sensitivity to bright lights, loud noises, or smells.    Before you get a migraine, you may get warning signs that a migraine is developing (aura). An aura may include:    Seeing flashing lights or having blind spots.  Seeing bright spots, halos, or zigzag lines.  Having tunnel vision or blurred vision.  Having numbness or a tingling feeling.  Having trouble talking.  Having muscle weakness.    How is this diagnosed?  A migraine headache can be diagnosed based on:    Your symptoms.  A physical exam.  Tests, such as CT scan or MRI of the head. These imaging tests can help rule out other causes of headaches.  Taking fluid from the spine (lumbar puncture) and analyzing it (cerebrospinal fluid analysis, or CSF analysis).    How is this treated?  A migraine headache is usually treated with medicines that:    Relieve pain.  Relieve nausea.  Prevent migraines from coming back.    Treatment may also include:    Acupuncture.  Lifestyle changes like avoiding foods that trigger migraines.    Follow these instructions at home:  Medicines     Take over-the-counter and prescription medicines only as told by your health care provider.  Do not drive or use heavy machinery while taking prescription pain medicine.  To prevent or treat constipation while you are taking prescription pain medicine, your health care provider may recommend that you:    Drink enough fluid to keep your urine clear or pale yellow.  Take over-the-counter or prescription medicines.  Eat foods that are high in fiber, such as fresh fruits and vegetables, whole grains, and beans.  Limit foods that are high in fat and processed sugars, such as fried and sweet foods.    Lifestyle     Avoid alcohol use.  Do not use any products that contain nicotine or tobacco, such as cigarettes and e-cigarettes. If you need help quitting, ask your health care provider.  Get at least 8 hours of sleep every night.  Limit your stress.  General instructions     Image   Keep a journal to find out what may trigger your migraine headaches. For example, write down:    What you eat and drink.  How much sleep you get.  Any change to your diet or medicines.    If you have a migraine:    Avoid things that make your symptoms worse, such as bright lights.  It may help to lie down in a dark, quiet room.  Do not drive or use heavy machinery.  Ask your health care provider what activities are safe for you while you are experiencing symptoms.    Keep all follow-up visits as told by your health care provider. This is important.  Contact a health care provider if:  You develop symptoms that are different or more severe than your usual migraine symptoms.  Get help right away if:  Your migraine becomes severe.  You have a fever.  You have a stiff neck.  You have vision loss.  Your muscles feel weak or like you cannot control them.  You start to lose your balance often.  You develop trouble walking.  You faint.  This information is not intended to replace advice given to you by your health care provider. Make sure you discuss any questions you have with your health care provider.

## 2025-04-15 ENCOUNTER — EMERGENCY (EMERGENCY)
Facility: HOSPITAL | Age: 35
LOS: 0 days | Discharge: HOME | End: 2025-04-15
Attending: STUDENT IN AN ORGANIZED HEALTH CARE EDUCATION/TRAINING PROGRAM
Payer: COMMERCIAL

## 2025-04-15 VITALS
RESPIRATION RATE: 18 BRPM | HEART RATE: 106 BPM | TEMPERATURE: 98 F | HEIGHT: 66 IN | WEIGHT: 169.98 LBS | DIASTOLIC BLOOD PRESSURE: 94 MMHG | OXYGEN SATURATION: 100 % | SYSTOLIC BLOOD PRESSURE: 144 MMHG

## 2025-04-15 VITALS — HEART RATE: 78 BPM

## 2025-04-15 DIAGNOSIS — Z91.040 LATEX ALLERGY STATUS: ICD-10-CM

## 2025-04-15 DIAGNOSIS — M79.641 PAIN IN RIGHT HAND: ICD-10-CM

## 2025-04-15 DIAGNOSIS — S60.211A CONTUSION OF RIGHT WRIST, INITIAL ENCOUNTER: ICD-10-CM

## 2025-04-15 DIAGNOSIS — M25.531 PAIN IN RIGHT WRIST: ICD-10-CM

## 2025-04-15 DIAGNOSIS — Z88.0 ALLERGY STATUS TO PENICILLIN: ICD-10-CM

## 2025-04-15 DIAGNOSIS — R20.2 PARESTHESIA OF SKIN: ICD-10-CM
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
## 2025-04-15 DIAGNOSIS — Z91.013 ALLERGY TO SEAFOOD: ICD-10-CM

## 2025-04-15 PROCEDURE — 99284 EMERGENCY DEPT VISIT MOD MDM: CPT | Mod: 25

## 2025-04-15 PROCEDURE — 73090 X-RAY EXAM OF FOREARM: CPT | Mod: 26,RT

## 2025-04-15 PROCEDURE — 73110 X-RAY EXAM OF WRIST: CPT | Mod: RT

## 2025-04-15 PROCEDURE — 73110 X-RAY EXAM OF WRIST: CPT | Mod: 26,RT

## 2025-04-15 PROCEDURE — 99284 EMERGENCY DEPT VISIT MOD MDM: CPT

## 2025-04-15 PROCEDURE — 73090 X-RAY EXAM OF FOREARM: CPT | Mod: RT

## 2025-04-15 RX ORDER — IBUPROFEN 200 MG
600 TABLET ORAL ONCE
Refills: 0 | Status: COMPLETED | OUTPATIENT
Start: 2025-04-15 | End: 2025-04-15

## 2025-04-15 NOTE — ED PROVIDER NOTE - PATIENT PORTAL LINK FT
You can access the FollowMyHealth Patient Portal offered by [facility] by registering at the following website: [email or web address]. By joining [employer]’s FollowMyHealth portal, you will also be able to view your health information using other applications (apps) compatible with our system.

## 2025-04-15 NOTE — ED PROVIDER NOTE - OBJECTIVE STATEMENT
35 years old female no significant history presents complaints of right sided hand/wrist pain after she slammed the countertop earlier tonight.  Continue feeling pain and tingling to the ulnar side of the wrist so she comes to ED for evaluation.  Denies numbness and weakness to right hand.  Denies any injury.

## 2025-04-15 NOTE — ED ADULT NURSE NOTE - NSFALLUNIVINTERV_ED_ALL_ED
Monitor gait and stability/Monitor for mental status changes and reorient to person, place, and time, as needed/Bed/Stretcher in lowest position, wheels locked, appropriate side rails in place/Call bell, personal items and telephone in reach/Instruct patient to call for assistance before getting out of bed/chair/stretcher/Non-slip footwear applied when patient is off stretcher/[place] to call system/Physically safe environment - no spills, clutter or unnecessary equipment/Purposeful proactive rounding/Room/bathroom lighting operational, light cord in reach

## 2025-04-15 NOTE — ED PROVIDER NOTE - CARE PROVIDER_API CALL
Ry Mccarthy  Orthopaedic Surgery  [address] [place] [address]  Phone: [phone]  Fax: [phone]  Follow Up Time:

## 2025-04-15 NOTE — ED PROVIDER NOTE - CLINICAL SUMMARY MEDICAL DECISION MAKING FREE TEXT BOX
(1) stephen
35 yr old f that presents with RUE pain. imaging, pain management. on imaging no acute pathology. pt neurovascularly intact and reports improvement. Imaging was ordered and reviewed by me.  Appropriate medications for patient's presenting complaints were ordered and effects were reassessed.  Patient's records (prior hospital, ED visit, and/or nursing home notes if available) were reviewed.  Additional history was obtained from EMS, family, and/or PCP (where available).  Escalation to admission/observation was considered.  However patient feels much better and is comfortable with discharge.  Appropriate follow-up was arranged.    I have discussed the discharge plan with the patient. The patient agrees with the plan, as discussed.  The patient understands Emergency Department diagnosis is a preliminary diagnosis often based on limited information and that the patient must adhere to the follow-up plan as discussed.  The patient understands that if the symptoms worsen or if prescribed medications do not have the desired/planned effect that the patient may return to the Emergency Department at any time for further evaluation and treatment.

## 2025-04-15 NOTE — ED PROVIDER NOTE - PHYSICAL EXAMINATION
CONSTITUTIONAL: Well-appearing; n no apparent distress.   MS: TTP to ulnar aspect of right wrist.  Right hand and wrist with full range of motion with no significant pain.  2+ ulnar and radial pulse.  Right hand neurovascular intact.  SKIN no skin injury to right hand and wrist.  NEURO/PSYCH: A & O x 4; grossly unremarkable.

## 2025-04-15 NOTE — ED PROVIDER NOTE - NSFOLLOWUPINSTRUCTIONS_ED_ALL_ED_FT
Wrist Contusion    Continues R.I.C.E (Rest, Ice, Compression and Elevation) in the next few days. Apply ice 3-4 times a day and 15 minutes a time.    A contusion is a deep bruise. Contusions are the result of a blunt injury to tissues and muscle fibers under the skin. The skin overlying the contusion may turn blue, purple, or yellow. Symptoms also include pain and swelling in the injured area.    SEEK IMMEDIATE MEDICAL CARE IF YOU HAVE THE FOLLOWING SYMPTOMS: severe pain, numbness, tingling, pain, weakness, or skin color/temperature change in any part of your body distal to the fracture.

## 2025-04-15 NOTE — ED PROVIDER NOTE - ATTENDING APP SHARED VISIT CONTRIBUTION OF CARE
35 yr old f w/ no pmh who presents with RUE pain. Pt states that she slammed her arm against a kitchen cabinet and since then has been having pain. Pt denies any numbness, tingling, any other trauma, chest pain, sob or any other medical complaints.     CONSTITUTIONAL: Well-appearing; n no apparent distress.   MS: TTP to ulnar aspect of right wrist.  Right hand and wrist with full range of motion with no significant pain.  2+ ulnar and radial pulse.  Right hand neurovascular intact. full rom at the wrist, elbow and shoulder.  SKIN no skin injury to right hand and wrist.  NEURO/PSYCH: A & O x 4; grossly unremarkable.    35 yr old f that presents with RUE pain. imaging, pain management. reassess. dispo pending.